# Patient Record
Sex: FEMALE | Race: WHITE | NOT HISPANIC OR LATINO | Employment: FULL TIME | URBAN - METROPOLITAN AREA
[De-identification: names, ages, dates, MRNs, and addresses within clinical notes are randomized per-mention and may not be internally consistent; named-entity substitution may affect disease eponyms.]

---

## 2018-05-09 ENCOUNTER — HOSPITAL ENCOUNTER (OUTPATIENT)
Facility: HOSPITAL | Age: 53
Setting detail: OBSERVATION
Discharge: HOME HEALTH | DRG: 552 | End: 2018-05-15
Attending: EMERGENCY MEDICINE | Admitting: SURGERY
Payer: COMMERCIAL

## 2018-05-09 ENCOUNTER — APPOINTMENT (EMERGENCY)
Dept: RADIOLOGY | Facility: HOSPITAL | Age: 53
DRG: 552 | End: 2018-05-09
Attending: EMERGENCY MEDICINE
Payer: COMMERCIAL

## 2018-05-09 DIAGNOSIS — R55 SYNCOPE, UNSPECIFIED SYNCOPE TYPE: ICD-10-CM

## 2018-05-09 DIAGNOSIS — S32.039A CLOSED FRACTURE OF THIRD LUMBAR VERTEBRA, UNSPECIFIED FRACTURE MORPHOLOGY, INITIAL ENCOUNTER (CMS/HCC): ICD-10-CM

## 2018-05-09 DIAGNOSIS — S32.009A LUMBAR VERTERBRAL FRACTURE, TRAUMATIC: Primary | ICD-10-CM

## 2018-05-09 LAB
ABO + RH BLD: NORMAL
ALBUMIN SERPL-MCNC: 3.9 G/DL (ref 3.4–5)
ALP SERPL-CCNC: 63 IU/L (ref 35–126)
ALT SERPL-CCNC: 11 IU/L (ref 11–54)
ANION GAP SERPL CALC-SCNC: 7 MEQ/L (ref 3–15)
APTT PPP: 25 SEC. (ref 23–35)
AST SERPL-CCNC: 23 IU/L (ref 15–41)
BASOPHILS # BLD: 0.03 K/UL (ref 0.01–0.1)
BASOPHILS NFR BLD: 0.2 %
BILIRUB SERPL-MCNC: 0.8 MG/DL (ref 0.3–1.2)
BLD GP AB SCN SERPL QL: NEGATIVE
BUN SERPL-MCNC: 17 MG/DL (ref 8–20)
CALCIUM SERPL-MCNC: 8.9 MG/DL (ref 8.9–10.3)
CHLORIDE SERPL-SCNC: 108 MMOL/L (ref 98–109)
CO2 SERPL-SCNC: 22 MMOL/L (ref 22–32)
CREAT SERPL-MCNC: 0.8 MG/DL (ref 0.6–1.1)
D AG BLD QL: POSITIVE
DIFFERENTIAL METHOD BLD: ABNORMAL
EOSINOPHIL # BLD: 0.1 K/UL (ref 0.04–0.36)
EOSINOPHIL NFR BLD: 0.8 %
ERYTHROCYTE [DISTWIDTH] IN BLOOD BY AUTOMATED COUNT: 11.9 % (ref 11.7–14.4)
ETHANOL SERPL-MCNC: <5 MG/DL
GFR SERPL CREATININE-BSD FRML MDRD: >60 ML/MIN/1.73M*2
GLUCOSE SERPL-MCNC: 113 MG/DL (ref 70–99)
HCG UR QL: NEGATIVE
HCT VFR BLDCO AUTO: 36.3 % (ref 35–45)
HGB BLD-MCNC: 12.9 G/DL (ref 11.8–15.7)
IMM GRANULOCYTES # BLD AUTO: 0.14 K/UL (ref 0–0.08)
IMM GRANULOCYTES NFR BLD AUTO: 1.1 %
INR PPP: 1.1 INR
LABORATORY COMMENT REPORT: NORMAL
LABORATORY COMMENT REPORT: NORMAL
LYMPHOCYTES # BLD: 2.05 K/UL (ref 1.2–3.5)
LYMPHOCYTES NFR BLD: 16.3 %
MCH RBC QN AUTO: 30.9 PG (ref 28–33.2)
MCHC RBC AUTO-ENTMCNC: 35.5 G/DL (ref 32.2–35.5)
MCV RBC AUTO: 87.1 FL (ref 83–98)
MONOCYTES # BLD: 0.65 K/UL (ref 0.28–0.8)
MONOCYTES NFR BLD: 5.2 %
NEUTROPHILS # BLD: 9.59 K/UL (ref 1.7–7)
NEUTS SEG NFR BLD: 76.4 %
NRBC BLD-RTO: 0 %
PDW BLD AUTO: 8.3 FL (ref 9.4–12.3)
PLATELET # BLD AUTO: 202 K/UL (ref 150–369)
POTASSIUM SERPL-SCNC: 3.2 MMOL/L (ref 3.6–5.1)
PROT SERPL-MCNC: 6.6 G/DL (ref 6–8.2)
PROTHROMBIN TIME: 13.9 SEC. (ref 12.2–14.5)
RBC # BLD AUTO: 4.17 M/UL (ref 3.93–5.22)
SODIUM SERPL-SCNC: 137 MMOL/L (ref 136–144)
TROPONIN I SERPL-MCNC: <0.02 NG/ML
WBC # BLD AUTO: 12.56 K/UL (ref 3.8–10.5)

## 2018-05-09 PROCEDURE — 36415 COLL VENOUS BLD VENIPUNCTURE: CPT | Performed by: EMERGENCY MEDICINE

## 2018-05-09 PROCEDURE — 84484 ASSAY OF TROPONIN QUANT: CPT | Performed by: SURGERY

## 2018-05-09 PROCEDURE — 99285 EMERGENCY DEPT VISIT HI MDM: CPT | Mod: 25

## 2018-05-09 PROCEDURE — G0378 HOSPITAL OBSERVATION PER HR: HCPCS

## 2018-05-09 PROCEDURE — 63700000 HC SELF-ADMINISTRABLE DRUG

## 2018-05-09 PROCEDURE — 72125 CT NECK SPINE W/O DYE: CPT

## 2018-05-09 PROCEDURE — 74177 CT ABD & PELVIS W/CONTRAST: CPT

## 2018-05-09 PROCEDURE — 63600105 HC IODINE BASED CONTRAST: Performed by: EMERGENCY MEDICINE

## 2018-05-09 PROCEDURE — 84703 CHORIONIC GONADOTROPIN ASSAY: CPT | Performed by: EMERGENCY MEDICINE

## 2018-05-09 PROCEDURE — 71260 CT THORAX DX C+: CPT

## 2018-05-09 PROCEDURE — 85610 PROTHROMBIN TIME: CPT | Performed by: EMERGENCY MEDICINE

## 2018-05-09 PROCEDURE — 85025 COMPLETE CBC W/AUTO DIFF WBC: CPT | Performed by: EMERGENCY MEDICINE

## 2018-05-09 PROCEDURE — 80053 COMPREHEN METABOLIC PANEL: CPT | Performed by: EMERGENCY MEDICINE

## 2018-05-09 PROCEDURE — 63700000 HC SELF-ADMINISTRABLE DRUG: Performed by: PHYSICIAN ASSISTANT

## 2018-05-09 PROCEDURE — 85730 THROMBOPLASTIN TIME PARTIAL: CPT | Performed by: EMERGENCY MEDICINE

## 2018-05-09 PROCEDURE — 20000000 HC ROOM AND CARE ICU

## 2018-05-09 PROCEDURE — 73610 X-RAY EXAM OF ANKLE: CPT | Mod: LT

## 2018-05-09 PROCEDURE — 70450 CT HEAD/BRAIN W/O DYE: CPT

## 2018-05-09 PROCEDURE — 25800000 HC PHARMACY IV SOLUTIONS: Performed by: PHYSICIAN ASSISTANT

## 2018-05-09 PROCEDURE — 93005 ELECTROCARDIOGRAM TRACING: CPT | Performed by: EMERGENCY MEDICINE

## 2018-05-09 PROCEDURE — 86900 BLOOD TYPING SEROLOGIC ABO: CPT

## 2018-05-09 PROCEDURE — G0480 DRUG TEST DEF 1-7 CLASSES: HCPCS | Performed by: EMERGENCY MEDICINE

## 2018-05-09 RX ORDER — SODIUM CHLORIDE 9 MG/ML
1000 INJECTION, SOLUTION INTRAVENOUS CONTINUOUS
Status: ACTIVE | OUTPATIENT
Start: 2018-05-09 | End: 2018-05-10

## 2018-05-09 RX ORDER — POTASSIUM CHLORIDE 14.9 MG/ML
20 INJECTION INTRAVENOUS AS NEEDED
Status: DISCONTINUED | OUTPATIENT
Start: 2018-05-09 | End: 2018-05-15 | Stop reason: HOSPADM

## 2018-05-09 RX ORDER — OXYCODONE HYDROCHLORIDE 5 MG/1
5-10 TABLET ORAL EVERY 4 HOURS PRN
Status: DISCONTINUED | OUTPATIENT
Start: 2018-05-09 | End: 2018-05-13

## 2018-05-09 RX ORDER — ONDANSETRON 4 MG/1
4 TABLET, ORALLY DISINTEGRATING ORAL EVERY 8 HOURS PRN
Status: DISCONTINUED | OUTPATIENT
Start: 2018-05-09 | End: 2018-05-15 | Stop reason: HOSPADM

## 2018-05-09 RX ORDER — OXYCODONE HYDROCHLORIDE 5 MG/1
TABLET ORAL
Status: COMPLETED
Start: 2018-05-09 | End: 2018-05-09

## 2018-05-09 RX ORDER — IBUPROFEN 200 MG
16-32 TABLET ORAL AS NEEDED
Status: DISCONTINUED | OUTPATIENT
Start: 2018-05-09 | End: 2018-05-15 | Stop reason: HOSPADM

## 2018-05-09 RX ORDER — POTASSIUM CHLORIDE 750 MG/1
20 TABLET, FILM COATED, EXTENDED RELEASE ORAL AS NEEDED
Status: DISCONTINUED | OUTPATIENT
Start: 2018-05-09 | End: 2018-05-15 | Stop reason: HOSPADM

## 2018-05-09 RX ORDER — POTASSIUM CHLORIDE 750 MG/1
40 TABLET, FILM COATED, EXTENDED RELEASE ORAL AS NEEDED
Status: DISCONTINUED | OUTPATIENT
Start: 2018-05-09 | End: 2018-05-15 | Stop reason: HOSPADM

## 2018-05-09 RX ORDER — DEXTROSE 50 % IN WATER (D50W) INTRAVENOUS SYRINGE
25 AS NEEDED
Status: DISCONTINUED | OUTPATIENT
Start: 2018-05-09 | End: 2018-05-15 | Stop reason: HOSPADM

## 2018-05-09 RX ORDER — MAGNESIUM SULFATE HEPTAHYDRATE 40 MG/ML
2 INJECTION, SOLUTION INTRAVENOUS AS NEEDED
Status: ACTIVE | OUTPATIENT
Start: 2018-05-09 | End: 2018-05-13

## 2018-05-09 RX ORDER — ONDANSETRON HYDROCHLORIDE 2 MG/ML
4 INJECTION, SOLUTION INTRAVENOUS EVERY 8 HOURS PRN
Status: DISCONTINUED | OUTPATIENT
Start: 2018-05-09 | End: 2018-05-15 | Stop reason: HOSPADM

## 2018-05-09 RX ORDER — ACETAMINOPHEN 325 MG/1
650 TABLET ORAL EVERY 4 HOURS PRN
Status: DISCONTINUED | OUTPATIENT
Start: 2018-05-09 | End: 2018-05-15 | Stop reason: HOSPADM

## 2018-05-09 RX ORDER — DEXTROSE 40 %
15-30 GEL (GRAM) ORAL AS NEEDED
Status: DISCONTINUED | OUTPATIENT
Start: 2018-05-09 | End: 2018-05-15 | Stop reason: HOSPADM

## 2018-05-09 RX ADMIN — OXYCODONE HYDROCHLORIDE 5 MG: 5 TABLET ORAL at 19:57

## 2018-05-09 RX ADMIN — SODIUM CHLORIDE 1000 ML: 9 INJECTION, SOLUTION INTRAVENOUS at 21:54

## 2018-05-09 RX ADMIN — IOHEXOL 115 ML: 300 INJECTION, SOLUTION INTRAVENOUS at 18:22

## 2018-05-09 RX ADMIN — ACETAMINOPHEN 650 MG: 325 TABLET, FILM COATED ORAL at 23:15

## 2018-05-09 ASSESSMENT — ENCOUNTER SYMPTOMS
SEIZURES: 0
BACK PAIN: 1
SORE THROAT: 0
EYE PAIN: 0
ARTHRALGIAS: 0
ABDOMINAL PAIN: 0
COUGH: 0
HEMATURIA: 0
FEVER: 0
SHORTNESS OF BREATH: 0
PALPITATIONS: 0
DYSURIA: 0
CHILLS: 0
COLOR CHANGE: 0

## 2018-05-09 ASSESSMENT — COGNITIVE AND FUNCTIONAL STATUS - GENERAL
EATING MEALS: 4 - NONE
WALKING IN HOSPITAL ROOM: 4 - NONE
HELP NEEDED FOR BATHING: 4 - NONE
CLIMB 3 TO 5 STEPS WITH RAILING: 4 - NONE
DRESSING REGULAR UPPER BODY CLOTHING: 4 - NONE
HELP NEEDED FOR PERSONAL GROOMING: 4 - NONE
MOVING TO AND FROM BED TO CHAIR: 4 - NONE
TOILETING: 4 - NONE
DRESSING REGULAR LOWER BODY CLOTHING: 4 - NONE
STANDING UP FROM CHAIR USING ARMS: 4 - NONE

## 2018-05-09 NOTE — ED PROVIDER NOTES
"HPI     No chief complaint on file.      53 yo F with no PMH presents after found down on deck after falling from 8ft ladder.  Does not take any meds  Was down x 1 hour  No LOC  C/o low back pain.   When helped up by medics, pt syncopized                 Patient History     No past medical history on file.    No past surgical history on file.    No family history on file.    Social History   Substance Use Topics   • Smoking status: Not on file   • Smokeless tobacco: Not on file   • Alcohol use Not on file       Systems Reviewed from Nursing Triage:          Review of Systems     Review of Systems   Constitutional: Negative for chills and fever.   HENT: Negative for ear pain and sore throat.    Eyes: Negative for pain and visual disturbance.   Respiratory: Negative for cough and shortness of breath.    Cardiovascular: Negative for chest pain and palpitations.   Gastrointestinal: Negative for abdominal pain.   Genitourinary: Negative for dysuria and hematuria.   Musculoskeletal: Positive for back pain. Negative for arthralgias.   Skin: Negative for color change and rash.   Neurological: Negative for seizures and syncope.   All other systems reviewed and are negative.       Physical Exam     ED Triage Vitals [05/09/18 1756]   Temp Pulse Resp BP SpO2   36.5 °C (97.7 °F) -- 20 122/73 100 %      Temp Source Heart Rate Source Patient Position BP Location FiO2 (%) (Set)   Temporal -- -- -- --                     Patient Vitals for the past 24 hrs:   BP Temp Temp src Resp SpO2 Height Weight   05/09/18 1813 118/71 - - 20 100 % 1.753 m (5' 9\") 70.3 kg (155 lb)   05/09/18 1756 122/73 36.5 °C (97.7 °F) Temporal 20 100 % - -           Physical Exam   Constitutional: She appears well-developed and well-nourished. No distress.   HENT:   Head: Normocephalic.   Left occipital contusion   Eyes: Conjunctivae and EOM are normal. Pupils are equal, round, and reactive to light.   Neck:   c-collar in place   Cardiovascular: Normal rate and " regular rhythm.    No murmur heard.  Pulmonary/Chest: Effort normal and breath sounds normal. No respiratory distress.   Abdominal: Soft. There is no tenderness.   Musculoskeletal: Normal range of motion. She exhibits no edema.   Low L-spine tenderness. No chest wall tenderness, pelvis stable   Neurological: She is alert.   Skin: Skin is warm and dry.   Abrasion to right lateral thigh and anterior lower leg   Psychiatric: She has a normal mood and affect.   Nursing note and vitals reviewed.           Procedures    ED Course & MDM     Labs Reviewed   CBC - Abnormal        Result Value    WBC 12.56 (*)     MPV 8.3 (*)     RBC 4.17      Hemoglobin 12.9      Hematocrit 36.3      MCV 87.1      MCH 30.9      MCHC 35.5      RDW 11.9      Platelets 202     DIFF COUNT - Abnormal     Immature Granulocytes, Absolute 0.14 (*)     Neutrophils, Absolute 9.59 (*)     Differential Type Auto      nRBC 0.0      Immature Granulocytes 1.1      Neutrophils 76.4      Lymphocytes 16.3      Monocytes 5.2      Eosinophils 0.8      Basophils 0.2      Lymphocytes, Absolute 2.05      Monocytes, Absolute 0.65      Eosinophils, Absolute 0.10      Basophils, Absolute 0.03     CBC AND DIFFERENTIAL    Narrative:     The following orders were created for panel order CBC and differential.  Procedure                               Abnormality         Status                     ---------                               -----------         ------                     CBC[[id number]]                           Abnormal            Final result               Diff Count[[id number]]                    Abnormal            Final result                 Please view results for these tests on the individual orders.   COMPREHENSIVE METABOLIC PANEL   BHCG, SERUM, QUAL   DRUG SCREEN PANEL, URINE   ETHANOL   PROTIME-INR   PTT   TYPE AND SCREEN   URINALYSIS W REFLEX TO MICROSCOPIC    Narrative:     The following orders were created for panel order Urinalysis.  Procedure                                Abnormality         Status                     ---------                               -----------         ------                     UA Macroscopic[[id number]]                                                                 Please view results for these tests on the individual orders.   RAINBOW DRAW PANEL    Narrative:     The following orders were created for panel order [facility] Draw Panel.  Procedure                               Abnormality         Status                     ---------                               -----------         ------                     RAINBOW RED[[id number]]                                       In process                 RAINBOW LT GREEN[[id number]]                                  In process                   Please view results for these tests on the individual orders.   UA MACROSCOPIC   RAINBOW RED   RAINBOW LT GREEN       CT HEAD WITHOUT IV CONTRAST    (Results Pending)   CT CERVICAL SPINE WITHOUT IV CONTRAST    (Results Pending)   CT CHEST WITH IV CONTRAST    (Results Pending)   CT ABDOMEN PELVIS WITH IV CONTRAST    (Results Pending)   X-RAY ANKLE LEFT 3+ VIEWS    (Results Pending)           MDM  Number of Diagnoses or Management Options  Diagnosis management comments: Found down/syncope likely vasovagal 2/2 pain or prolonged supine position but can represent cardiac etiology or other injury  -labs  -ekg  -panscan  -eval/care per trauma           ED Course as of May 09 2007   Wed May 09, 2018   1840 IMPRESSION:  Mild to moderate compression deformity involving superior endplate of L3  vertebral body of unknown chronicity without other post traumatic abnormality in  the chest, abdomen and pelvis.  []   1841   IMPRESSION:  No posttraumatic intracranial abnormality.  []   2004 IMPRESSION:    No acute bony abnormality.  []   2006 Admitted to trauma.  []      ED Course User Index  [] Izabela Cm MD         Clinical Impressions as of May 09 2007    Closed fracture of third lumbar vertebra, unspecified fracture morphology, initial encounter (CMS/HCC) ([facility])     Disposition:       Izabela Cm MD  Resident  05/09/18 2007

## 2018-05-09 NOTE — ED NOTES
EMS reports that she fell from an 8 foot ladder.  Was assisted to feet and then passed out.     Paula Dixon RN  05/09/18 1870

## 2018-05-09 NOTE — ED ATTESTATION NOTE
Procedures  Physical Exam  Review of Systems    5/9/20186:07 PM  I have personally seen and examined the patient.  I reviewed and agree with the PA/NP/Resident's assessment and plan of care.    My examination, assessment, and plan of care [facility] is as follows:  The patient presents with lower back pain status post a fall.  The patient was on a ladder when the ladder moved and she fell striking her head.  We do not believe there was a loss of consciousness.  The patient was lying on her back around an hour and then attempted to get up when she then syncopized.  There was a loss of consciousness involved with that episode.  Patient was then flown to [place]  Please see nursing note and EPIC trauma narrator (flow sheet) for accurate time line.   Please see trauma narrator for the physical exam that was completed by me and scribed by the nurse.    ATLS evaluation done by ED MD and Trauma Team  Imaging, observe          I was physically present for the key/critical portions of the following procedures: None     Marco Hansen MD  05/09/18 7825

## 2018-05-09 NOTE — H&P
TRAUMA SURGERY HISTORY & PHYSICAL     [facility]:  [facility]      YOB: 1902   AGE:  89 y.o.      GENDER: female  MRN:  [id number]          PATIENT #: [id number]    CHIEF COMPLAINT:  Trauma    Activation Time: 1756 Level of Trauma: Code 9   Time Patient Seen: Code 9      HISTORY OF INJURY   The patient is a 52 year old female who denies any significant past medical history and with a past surgical history significant for cholecystectomy who presents after a fall from ladder.  She apparently struck her head but had no immediate loss of consciousness. She was unable to get up. EMS was eventually called.  When they stood her up, she had a syncopal event.  She came to. She was hemodynamically stable.  She was flown here as Code 9 trauma activation.  She was hemodynamically stable on arrival.  She has bilateral hip pain and lower back pain.     Patient has vital signs that may not be electronically linked with the chart.   Initial vital signs upon arrival in the [facility]:  BP  122/73      P  61      RR  20      SpO2   100% RA       T  97.7    RESUSCITATION:   O2: RA Lines/Location: PIV   Intubation: No IVF/PRBCs/Amount: No product resuscitation required at this time.      REVIEW OF SYSTEMS   13 point review of systems completed. Negative except for above mentioned history.    PAST MEDICAL HISTORY   Denies any significant PMH    PAST SURGICAL HISTORY   Cholecystectomy     FAMILY HISTORY   Non-contributory    SOCIAL HISTORY     Denies current tobacco use of abuse  Occasional EtOH use  Denies past or current IV or illicit drug use or abuse.     MEDICATIONS   Denies any current medications.     ALLERGIES   NKDA    PRIMARY CARE PHYSICIAN   To Obtain Pcp Unable    PRIMARY SURVEY   Airway: Patent:  Intact - speaking/phonating easily.   Breathing: Spontaneous, unlabored:  Symmetric.   Circulation:  Skin is pink/warm/dry, central and peripheral pulses present.   Disability: Alert and moving all  extremities. GCS 15       SECONDARY SURVEY   Physical Exam  Head:  Hematoma to right of occiput.  Small scalp abrasion.  No apparent laceration.   Face:  Nontender on palpation of facial bones.   Ears:  No external trauma.  No right or left hemotympanum.   Nose:  No nasal deformity.  No nasal discharge.   Eyes:  Pupils 7 mm and reactive bilaterally.  EOMI. No hyphema.  No evidence of globe trauma.   Mouth:  No oropharyngeal lesion.   Neck:  Nontender on palpation of posterior midline.  Trachea is midline anteriorly.   Chest wall:  Mild tenderness on palpation over sternum.   Lungs: CTAB/L - no diminished breath sounds.   Heart:  RRR - no muffled heart tones.   Abdomen:  Soft, NT/ND - no R/G.   Pelvis:  No tederness on pelvic rock (depite c/o hip pain) - patient does have low back pain on pelvic rock.  Back:  Tenderness over lumbar spine - no obvious fracture or stepoff.    Upper extremities: Atraumatic bilaterally. All compartments soft.  Full sensation, pulses, motor.   Abrasion over right thigh and shin.  All compartments soft. Full pulses.   Left thigh and calf compartments soft.  Full pulses and sensation.  Tenderness on palpation of left ankle.   Neurologic:  GCS 15        IMPRESSION/PLAN   53 y/o female s/p fall from ladder.    1. Fall from ladder.   2. Cephalohematoma on examination - check CT brain.   3. Cervical collar in place - check CT cervical spine.   4. Check CT C/A/P  5. Lower back tenderness - will evaluate lumbar spine on CT torso.   6. Left ankle tenderness - check plain film.   7. Syncope:  Check EKG, TPN I.    8. Check full laboratory evaluation.             AUTHOR:  Dmitriy Tapia MD  Trauma Service pager #[phone], [phone]  5/9/2018  6:05 PM

## 2018-05-10 ENCOUNTER — APPOINTMENT (INPATIENT)
Dept: OCCUPATIONAL THERAPY | Facility: HOSPITAL | Age: 53
DRG: 552 | End: 2018-05-10
Attending: PHYSICIAN ASSISTANT
Payer: COMMERCIAL

## 2018-05-10 PROBLEM — R55 SYNCOPE: Status: ACTIVE | Noted: 2018-05-10

## 2018-05-10 LAB
RAINBOW HOLD SPECIMEN: NORMAL
RAINBOW HOLD SPECIMEN: NORMAL

## 2018-05-10 PROCEDURE — G0378 HOSPITAL OBSERVATION PER HR: HCPCS

## 2018-05-10 PROCEDURE — 97165 OT EVAL LOW COMPLEX 30 MIN: CPT | Mod: GO

## 2018-05-10 PROCEDURE — 63700000 HC SELF-ADMINISTRABLE DRUG: Performed by: PHYSICIAN ASSISTANT

## 2018-05-10 PROCEDURE — 99253 IP/OBS CNSLTJ NEW/EST LOW 45: CPT | Performed by: NEUROLOGICAL SURGERY

## 2018-05-10 PROCEDURE — 63600000 HC DRUGS/DETAIL CODE: Performed by: PHYSICIAN ASSISTANT

## 2018-05-10 PROCEDURE — 20000000 HC ROOM AND CARE ICU

## 2018-05-10 RX ADMIN — OXYCODONE HYDROCHLORIDE 10 MG: 5 TABLET ORAL at 06:24

## 2018-05-10 RX ADMIN — OXYCODONE HYDROCHLORIDE 10 MG: 5 TABLET ORAL at 16:43

## 2018-05-10 RX ADMIN — OXYCODONE HYDROCHLORIDE 10 MG: 5 TABLET ORAL at 00:07

## 2018-05-10 RX ADMIN — OXYCODONE HYDROCHLORIDE 10 MG: 5 TABLET ORAL at 10:43

## 2018-05-10 RX ADMIN — ONDANSETRON 4 MG: 2 INJECTION INTRAMUSCULAR; INTRAVENOUS at 12:14

## 2018-05-10 ASSESSMENT — COGNITIVE AND FUNCTIONAL STATUS - GENERAL
EATING MEALS: 4 - NONE
TOILETING: 3 - A LITTLE
HELP NEEDED FOR BATHING: 3 - A LITTLE
DRESSING REGULAR LOWER BODY CLOTHING: 3 - A LITTLE
HELP NEEDED FOR PERSONAL GROOMING: 3 - A LITTLE
DRESSING REGULAR UPPER BODY CLOTHING: 3 - A LITTLE

## 2018-05-10 NOTE — PROGRESS NOTES
Patient: Ofe Arzola  Location: [facility]  MRN: [id number]  Today's date: 5/10/2018    Attempted to see patient for therapy. Unable due to patient medically unstable.  awaiting LSO. Will follow.    2nd attempt at 15:15, cont to await LSO.

## 2018-05-10 NOTE — PROGRESS NOTES
CT Cspine reviewed- no acute findings; Cervical spine completely nontender.   Full ROM without any midline tenderness. + minimal left trapezius tenderness. Aspen d/c'd.   Reviewed CT findings of L3 end plate fx. Remains neuro intact. Nsx consult pending in AM

## 2018-05-10 NOTE — ASSESSMENT & PLAN NOTE
This was a vagal induced syncope from extreme pain when ems lifted patient.  Cardiac monitor stable over night

## 2018-05-10 NOTE — PROGRESS NOTES
"Daily Progress Note    Daily Progress Note    Subjective     Interval History: none.       Objective     Vital signs in last 24 hours:  Temp:  [36.5 °C (97.7 °F)-36.8 °C (98.3 °F)] 36.5 °C (97.7 °F)  Heart Rate:  [49-73] 51  Resp:  [13-20] 16  BP: ()/(53-75) 91/53      Intake/Output Summary (Last 24 hours) at 05/10/18 0557  Last data filed at 05/10/18 0400   Gross per 24 hour   Intake              968 ml   Output              600 ml   Net              368 ml     Intake/Output this shift:  I/O this shift:  In: 968 [P.O.:480; I.V.:488]  Out: 600 [Urine:600]    Labs    I have reviewed the patients labs until the time of note; no clinical concerns    Imaging  I have independently reviewed patient's imaging; no concerning findings.    VTE Assessment  Patient's VTE risk appears at baseline; no specific VTE Chemoprophylaxis indicated       Physical Exam:  BP (!) 91/53   Pulse (!) 51   Temp 36.5 °C (97.7 °F) (Oral)   Resp 16   Ht 1.753 m (5' 9\")   Wt 70.3 kg (155 lb)   SpO2 99%   BMI 22.89 kg/m²      General Appearance:    Alert, cooperative, no distress, appears stated age   Head:    Normocephalic, without obvious abnormality, atraumatic   Eyes:    PERRL, conjunctiva/corneas clear, EOM's intact, fundi     benign, both eyes   Ears:    Normal TM's and external ear canals, both ears   Nose:   Nares normal, septum midline, mucosa normal, no drainage     or     sinus tenderness   Throat:   Lips, mucosa, and tongue normal; teeth and gums normal   Neck:   Supple, symmetrical, trachea midline, no adenopathy;     thyroid:  no enlargement/tenderness/nodules; no carotid    bruit or JVD   Back:     Symmetric, no curvature, ROM normal, no CVA tenderness   Lungs:     Clear to auscultation bilaterally, respirations unlabored   Chest Wall:    No tenderness or deformity   Heart:    Regular rate and rhythm, S1 and S2 normal, no murmur, rub or          gallop   Breast Exam:    No tenderness, masses, or nipple abnormality "   Abdomen:     Soft, non-tender, bowel sounds active all four quadrants,     no masses, no organomegaly   Genitalia:    Normal female without lesion, discharge or tenderness   Rectal:    Normal tone, no masses or tenderness; guaiac negative stool   Extremities:   Extremities normal, atraumatic, no cyanosis or edema   Musculoskeletal:  Pulses:   No injury or deformity    2+ and symmetric all extremities   Skin:   Skin color, texture, turgor normal, no rashes or lesions   Lymph nodes:   Cervical, supraclavicular, and axillary nodes normal   Neurologic:    Behavior/  Emotional:   CNII-XII intact, normal strength, sensation and reflexes     throughout    Appropriate, cooperative         Assessment & Plan  Syncope   Assessment & Plan    This was a vagal induced syncope from extreme pain when ems lifted patient.  Cardiac monitor stable over night        Lumbar verterbral fracture, traumatic (CMS/[facility]) ([facility])   Assessment & Plan    Lumbar compression fx, NS to see, neuro intact, brace vs surgery, will have plan today            Expected Discharge Date:  5/12/2018

## 2018-05-10 NOTE — SUBJECTIVE & OBJECTIVE
"Daily Progress Note    Subjective     Interval History: none.       Objective     Vital signs in last 24 hours:  Temp:  [36.5 °C (97.7 °F)-36.8 °C (98.3 °F)] 36.5 °C (97.7 °F)  Heart Rate:  [49-73] 51  Resp:  [13-20] 16  BP: ()/(53-75) 91/53      Intake/Output Summary (Last 24 hours) at 05/10/18 0557  Last data filed at 05/10/18 0400   Gross per 24 hour   Intake              968 ml   Output              600 ml   Net              368 ml     Intake/Output this shift:  I/O this shift:  In: 968 [P.O.:480; I.V.:488]  Out: 600 [Urine:600]    Labs    I have reviewed the patients labs until the time of note; no clinical concerns    Imaging  I have independently reviewed patient's imaging; no concerning findings.    VTE Assessment  Patient's VTE risk appears at baseline; no specific VTE Chemoprophylaxis indicated       Physical Exam:  BP (!) 91/53   Pulse (!) 51   Temp 36.5 °C (97.7 °F) (Oral)   Resp 16   Ht 1.753 m (5' 9\")   Wt 70.3 kg (155 lb)   SpO2 99%   BMI 22.89 kg/m²     General Appearance:    Alert, cooperative, no distress, appears stated age   Head:    Normocephalic, without obvious abnormality, atraumatic   Eyes:    PERRL, conjunctiva/corneas clear, EOM's intact, fundi     benign, both eyes   Ears:    Normal TM's and external ear canals, both ears   Nose:   Nares normal, septum midline, mucosa normal, no drainage     or     sinus tenderness   Throat:   Lips, mucosa, and tongue normal; teeth and gums normal   Neck:   Supple, symmetrical, trachea midline, no adenopathy;     thyroid:  no enlargement/tenderness/nodules; no carotid    bruit or JVD   Back:     Symmetric, no curvature, ROM normal, no CVA tenderness   Lungs:     Clear to auscultation bilaterally, respirations unlabored   Chest Wall:    No tenderness or deformity   Heart:    Regular rate and rhythm, S1 and S2 normal, no murmur, rub or          gallop   Breast Exam:    No tenderness, masses, or nipple abnormality   Abdomen:     Soft, " non-tender, bowel sounds active all four quadrants,     no masses, no organomegaly   Genitalia:    Normal female without lesion, discharge or tenderness   Rectal:    Normal tone, no masses or tenderness; guaiac negative stool   Extremities:   Extremities normal, atraumatic, no cyanosis or edema   Musculoskeletal:  Pulses:   No injury or deformity    2+ and symmetric all extremities   Skin:   Skin color, texture, turgor normal, no rashes or lesions   Lymph nodes:   Cervical, supraclavicular, and axillary nodes normal   Neurologic:    Behavior/  Emotional:   CNII-XII intact, normal strength, sensation and reflexes     throughout    Appropriate, cooperative

## 2018-05-10 NOTE — CONSULTS
Neurosurgery Consultation     Chief Complaint:  Back pain.    HPI      Patient is a 89 y.o. female (actually 49+yo) who presents with L3 compression fracture. She states that she works as a  and yesterday was on a ladder while working when the ladder gave out, causing her to fall approximately 8 feet.  She says she fell on her back and hit the back of her head.  Denies LOC but does say that when EMS came and tried to stand her up, she had a syncopal event.  She was hemodynamically stable but CT scan demonstrated lumbar fracture.  Currently she denies headache, denies neck pain or radiating pain into the arms or legs.  She denies numbness or tingling in her extremities.  No bowel or bladder incontinence.  She does have low back pain associated with movement.  No chest pain, no SOB or difficulty breathing.    Medical History:   Past Medical History:   Diagnosis Date   • Hives    • Syncope        Surgical History:   Past Surgical History:   Procedure Laterality Date   • CHOLECYSTECTOMY         Social History:   Social History     Social History Narrative   • No narrative on file       Family History: History reviewed. No pertinent family history.    Allergies: Patient has no known allergies.    Home Medications:      Current Medications:  •  acetaminophen, 650 mg, oral, q4h PRN  •  calcium gluconate, 1 g, intravenous, PRN  •  glucose, 16-32 g of dextrose, oral, PRN **OR** dextrose, 15-30 g of dextrose, oral, PRN **OR** glucagon, 1 mg, intramuscular, PRN **OR** dextrose in water, 25 mL, intravenous, PRN  •  magnesium sulfate, 1 g, intravenous, PRN **OR** magnesium sulfate, 2 g, intravenous, PRN  •  ondansetron ODT, 4 mg, oral, q8h PRN **OR** ondansetron, 4 mg, intravenous, q8h PRN  •  oxyCODONE, 5-10 mg, oral, q4h PRN  •  potassium chloride, 20 mEq, oral, PRN **OR** potassium chloride, 40 mEq, oral, PRN **OR** potassium chloride, 20 mEq, intravenous, PRN **OR** potassium chloride, 40 mEq, intravenous, PRN  •   sodium chloride 0.9 %, 1,000 mL, intravenous, Continuous    Review of Systems: 14 point review of systems are negative except for the pertinent positives as seen in the HPI portion of this note.    Objective     Vital Signs for the last 24 hours:  Temp:  [36.5 °C (97.7 °F)-36.8 °C (98.3 °F)] 36.5 °C (97.7 °F)  Heart Rate:  [47-73] 47  Resp:  [13-20] 14  BP: ()/(52-75) 95/52       Physicial Exam    Awake, alert, oriented to person, place, time and situation; speech and fund of knowledge normal. Neck is supple w/ FROM, is nontender. No evidence of labored breathing and chest CTA. Heart has a regular rate and rhythm. Abdomen soft, NT, ND.    Neurologic Examination:     Mental status: awake, alert, and oriented to person, place, and time. Speech and fund of knowledge are normal. GCS 15    PERRL, EOMI, face symmetric, tongue protrudes to the midline, no nystagmus or diplopia.     Motor:   RUE: D: 5/5, T: 5/5, B: 5/5, WE: 5/5, H/5, IH: 5/5  LUE: D: 5/5, T: 5/5, B: 5/5, WE: 5/5, H/5, IH: 5/5  RLE: IP  5/5, Q  5/5, DF 5/5, EHL 5/5, PF 5/5  LLE: IP  5/5, Q  5/5, DF 5/5, EHL 5/5, PF 5/5    Reflexes: Normoreflexive throughout.   Negative dobbins and clonus bilaterally.     Sensation: intact to light touch, pain, temperature, and joint position sense testing.      No drift or dymetria.     Labs  I have reviewed the patient's labs.  Current labs are within normal limits.  Lab Results   Component Value Date    GLUCOSE 113 (H) 2018    CALCIUM 8.9 2018     2018    K 3.2 (L) 2018    CO2 22 2018     2018    BUN 17 2018    CREATININE 0.8 2018     Lab Results   Component Value Date    WBC 12.56 (H) 2018    HGB 12.9 2018    HCT 36.3 2018    MCV 87.1 2018     2018     Lab Results   Component Value Date    ALBUMIN 3.9 2018    BILITOT 0.8 2018    ALKPHOS 63 2018    AST 23 2018    ALT 11 2018     PROTEIN 6.6 05/09/2018       Imaging  Independent review of most recent imaging and all relevant previous imaging was compared with the reading of the attending radiologist. Significant findings include:     CT abdomen/pelvis shows a compression fracture of the L3 vertebral body.  Fracture involves vertebral body only with no extension into pedicles or posterior elements.  No malalignment      X-ray Ankle Left 3+ Views    Result Date: 5/9/2018  CLINICAL HISTORY: Fall from a lateral. COMMENT: Four views of the left ankle are performed.  No acute fractures or dislocation are seen.  Ankle mortise is symmetrical.  No soft tissue edema is visualized.     IMPRESSION: No acute bony abnormality.    Ct Head Without Iv Contrast    Result Date: 5/9/2018  CLINICAL HISTORY: Status post fall. COMMENT: Contiguous axial CT images of the brain is performed without intravenous contrast with sagittal and coronal reformatted images. COMPARISON:  None CT DOSE: One or more dose reduction technique (e.g.automated exposure control, adjustment of the kV and/or mA according to the patient's size, use of iterative reconstruction technique) utilized for this examination. No intraparenchymal mass, acute infarcts or hemorrhages are seen. The ventricles and the cisterns are normal. No extra-axial masses or focal fluid collections are seen. There is no midline shift. Visualized portions of the paranasal sinuses and the mastoid air cells are clear. No acute fractures are seen.  Left parietal soft tissue edema/hematoma is seen.     IMPRESSION: No posttraumatic intracranial abnormality.    Ct Chest With Iv Contrast    Result Date: 5/9/2018  CLINICAL HISTORY: Status post fall from a ladder. COMMENT: Contiguous axial CT images of the chest, abdomen and pelvis are performed with 115 cc of intravenous Omnipaque 350 without oral contrast. Sagittal and coronal reformatted images are also created. COMPARISON:  None CT DOSE: One or more dose reduction  technique (e.g.automated exposure control, adjustment of the kV and/or mA according to the patient's size, use of iterative reconstruction technique) utilized for this examination. No mediastinal hematoma is seen. The aorta is normal in appearance.  The lungs are clear.  There is no pneumothorax or pleural effusions.  Small hiatal hernia is seen. No post traumatic abnormalities are seen in the liver, spleen, pancreas, bilateral adrenal glands and the kidneys.  Patient is status post cholecystectomy. Urinary bladder is normal in appearance. The other pelvic structures are normal. There is no bowel wall thickening or distended loops of bowel. No mesenteric stranding is visualized. No free fluid is seen.  No adenopathy is visualized. Mild to moderate superior endplate fracture is seen involving L3 vertebral body of unknown chronicity.  No other fractures are seen involving the spine and pelvis.     IMPRESSION: Mild to moderate compression deformity involving superior endplate of L3 vertebral body of unknown chronicity without other post traumatic abnormality in the chest, abdomen and pelvis.    Ct Cervical Spine Without Iv Contrast    Result Date: 5/9/2018  CLINICAL HISTORY: Fall from a ladder COMMENT: Contiguous axial CT images of the cervical spine are performed with sagittal and coronal reformatted images. COMPARISON:  None CT DOSE: One or more dose reduction technique (e.g.automated exposure control, adjustment of the kV and/or mA according to the patient's size, use of iterative reconstruction technique) utilized for this examination. No acute fractures or spondylolisthesis are identified.  Minimal spondylolisthesis is seen at C5-C6 level where C6 vertebral body is anterior to C5 vertebral body.  Mild degenerative changes are seen in the cervical spine most prominent at C5-C6 level where there is mild disc osteophyte bulge with minimal bilateral uncovertebral hypertrophy causing minimal central canal stenosis  without neural foraminal stenosis.  No soft tissue edema is seen. The sagittal and coronal reformatted images demonstrate normal facet joint alignment. Evaluation of the central canal is limited in the lower cervical spine. Visualized lung apices are clear.  Mild opacification of left mastoid air cell is seen.     IMPRESSION:  No acute fractures.  As clinically indicated, MRI is recommended for further evaluation.     Ct Abdomen Pelvis With Iv Contrast    Result Date: 5/9/2018  CLINICAL HISTORY: Status post fall from a ladder. COMMENT: Contiguous axial CT images of the chest, abdomen and pelvis are performed with 115 cc of intravenous Omnipaque 350 without oral contrast. Sagittal and coronal reformatted images are also created. COMPARISON:  None CT DOSE: One or more dose reduction technique (e.g.automated exposure control, adjustment of the kV and/or mA according to the patient's size, use of iterative reconstruction technique) utilized for this examination. No mediastinal hematoma is seen. The aorta is normal in appearance.  The lungs are clear.  There is no pneumothorax or pleural effusions.  Small hiatal hernia is seen. No post traumatic abnormalities are seen in the liver, spleen, pancreas, bilateral adrenal glands and the kidneys.  Patient is status post cholecystectomy. Urinary bladder is normal in appearance. The other pelvic structures are normal. There is no bowel wall thickening or distended loops of bowel. No mesenteric stranding is visualized. No free fluid is seen.  No adenopathy is visualized. Mild to moderate superior endplate fracture is seen involving L3 vertebral body of unknown chronicity.  No other fractures are seen involving the spine and pelvis.     IMPRESSION: Mild to moderate compression deformity involving superior endplate of L3 vertebral body of unknown chronicity without other post traumatic abnormality in the chest, abdomen and pelvis.          Assessment/Plan   49+ yo F s/p fall to  ground from about 8 feet is found to have L3 vertebral body compression fracture.  - No indication requiring surgical intervention at this time.  Fracture according to TLICS criteria shows findings most consistent with stable fracture  - Therefore we recommend LSO brace for comfort.  She can have activity as tolerated but I believe the brace will provide her with pain control.    - Please obtain standing xrays prior to discharge.  Will need follow up xrays at 1 month and 3 months.    Thank you for the consultation.  Please call with any questions/clarifications/concerns    Code Status: Full Code    Camden Ruiz MD  5/10/2018 7:44 AM

## 2018-05-10 NOTE — PROGRESS NOTES
Patient: Ofe Arzola  Location: [facility]  MRN: [id number]  Today's date: 5/10/2018    Attempted to see patient for therapy. Unable due to patient medically unstable.     Waiting for TLSO to mobilize patient  With L 3 endplate fx s/p fall

## 2018-05-10 NOTE — PROGRESS NOTES
Called to room by nursing regarding questions about disability.  Met with pt, sister and brother in law in room.  Sister asked if care coordination helps with applying for disability.  Explained to pt and family that the disability claim is filed through a pt's employer.  Pt and sister states that this is not through an employer and not a workman's comp case.  Asked pt is she as a personal disability policy and pt stated she thinks she does and will check when she gets home.  Explained to pt that if there is paperwork to be completed then the trauma coordinator typically completes it.  Sister asking about a letter being drawn up.  Asked pt what the letter would be needed and who the letter would need to be addressed to and pt stated she wasn't sure.  Explained to pt and family that a letter could be drawn up if needed and we would need who the letter would need to be addressed to.  Sister asking about homecare vs outpt PT  Explained to sister that pt could have homecare set up and VN and PT could come to the home.  Sister asking if pt could have outpt Pt.  Explained to sister that pt would need to be able to get to the outpt center and be able to get in and out of the car.  And perhaps homecare would be better to start once pt goes home and she can work up to outpt.  Pt and sister agreed.  Explained to them that tlso brace is on order and rep should be in this evening to fit pt.  Once pt is fitted then she will need xrays and then PT will be able to see her which is planned for tomorrow.  Rebecca Pandya, MSN

## 2018-05-10 NOTE — PROGRESS NOTES
Patient: Ofe Arzola  Location: [facility]  MRN: [id number]  Today's date: 5/10/2018      Patient  Returned to seated in bedside chair over incontinence pad and draw sheet, needs in reach, nursing notified .         Pain/Vitals     Row Name 05/10/18 1722 05/10/18 1754       Pain/Comfort/Sleep    Presence of Pain complains of pain/discomfort complains of pain/discomfort    Preferred Pain Scale  -- word (verbal rating pain scale)    Pain Body Location - Orientation lower lower    Pain Body Location back back    Pain Rating: Rest 4 - moderate pain  --    Pain Rating: Activity  -- 6 - moderate-severe pain    Pain Management Interventions position adjusted;premedicated for activity premedicated for activity;pillow support provided;position adjusted       Vital Signs    Pulse 61 (!)  55    SpO2 100 % 99 %    Patient Activity At rest --   after activity     Oxygen Therapy None (Room air)  --    /60 129/71    BP Location Right upper arm Right upper arm    BP Method Automatic Automatic    Patient Position Lying Sitting          Prior Living Environment  Lives With: alone  Living Arrangements: house  Home Accessibility: stairs to enter home (10 steps  through main  entrance )  Stair Railings at Home: inside, present at both sides  Transportation Available: car  Living Environment Comment:  (1/2 bath on main level /7+7 stairs  vs stairglide  to 2nd fl)Number of Stairs, Main Entrance: ten  Stair Railings, Main Entrance: railings on both sides of stairsEquipment Currently Used at Home: none       Prior Level of Function  Ambulation: independent  Transferring: independent  Toileting: independent  Bathing: independent  Dressing: independent  Eating: independent  Communication: understands/communicates without difficulty  Swallowing: swallows foods/liquids without difficulty  Equipment Currently Used at Home: none  Prior Functional Level Comment:  ( has stairglide, bathroom GBs and  rollator  at home )Dominant Hand: right          OT Evaluation - 05/10/18 1747        Session Details    Document Type initial evaluation    Mode of Treatment occupational therapy    Patient/Family Observations --   sister  and [facility] visiting        Time Calculation    Start Time 1719    Stop Time 1747    Time Calculation (min) 28 min       General Information    Patient Profile Reviewed? yes    Onset of Illness/Injury or Date of Surgery 05/09/18    Referring Physician --   trauma    General Observations of Patient --   alert,awake , cooperative     Pertinent History of Current Functional Problem --   fell from ladder sustained L3 endplate fx, scalp hematoma    Hearing Precautions/Limitations WNL    Existing Precautions/Restrictions brace worn when out of bed       Orientation Log    City 3-->spontaneous/free recall    Kind of Place 3-->spontaneous/free recall    Name of [facility] 3-->spontaneous/free recall    Month 3-->spontaneous/free recall    Date 3-->spontaneous/free recall    Year 3-->spontaneous/free recall    Day of Week 3-->spontaneous/free recall    Clock Time 3-->spontaneous/free recall    Etiology/Event 3-->spontaneous/free recall    Pathology Deficits 3-->spontaneous/free recall    Total Score 30       Cognition/Psychosocial    Safety Awareness intact       Attention    Quiet Environment WNL, no concerns       Follows Commands/Answers Questions    Follows Commands/Directions multi-step commands followed 100% of time    Answers Questions Verbally complex questions answered without difficulty       Memory Assessment    Comment, Short Term Memory --   WNL    Comment, Long Term Memory WNL       Range of Motion (ROM)    General Range of Motion no range of motion deficits identified       Manual Muscle Testing (MMT)    General MMT Assessment no strength deficits identified    Comment --   5/5 RUE/ 4+/5 LUE (mild guarding )       Bed Chair  Transfer Training    Bed Mobility Assessment/Interventions rolling  left;supine to sit    Assistive Device (Bed Mobility) bed rails    Comment (Bed Mobility) --   bed flat log roll technique    Assistive Device (Transfers) --   none for few steps  to arm chair     Bed-Chair Transfers, [place] supervision    Sit-Stand Transfers, [place] modified independence;verbal cues    Verbal Cues (Sit-Stand Transfers) preparatory posture;safety    Stand-Sit Transfers, [place] modified independence;verbal cues   increased time     Verbal Cues (Stand-Sit Transfers) maintaining center of gravity over base of support;hand placement;maintaining precautions    Stand Pivot Transfers, [place] distant supervision    Roll Left, [place] supervision;verbal cues    Supine to Sit, [place] set up;distant supervision;verbal cues       Upper Body Dressing    Upper Body Dressing Tasks don;front-opening garment    Upper Body Dressing Self-Performance threads left arm, shirt;threads right arm, shirt;pulls shirt over head/around back;pulls shirt down/adjusts    [place] Assistance obtain clothes    Upper Body Dressing Position supported sitting    Upper Body [place] modified independence       Grooming    Self-Performance washes, rinses and dries face;washes, rinses and dries hands;brushes/bustos hair;oral care (brushing teeth, cleaning dentures    Grooming Position supported sitting    Grooming Setup Assistance obtain supplies    [place] independent       Eating    [place] feeding skills;finger foods;liquids to mouth;independent       Static Sitting Balance    [place], Unsupported Sitting modified independence    Sitting Position sitting on edge of bed    Time Able to Maintain Position 3 to 4 minutes       Static Standing Balance    [place], Unsupported Standing supervision;set up;verbal cues    Time Able to Maintain Position 1 to 2 minutes       Spinal Orthosis    Orthosis Type LSO (lumbar sacral orthosis)    Therapeutic Indications allow early motion;immobilize,  protect/position healing structures    Performance Skills able to don/doff orthosis independently;manages clothing over/under orthosis;coordination adequate for orthosis skills;continue orthosis skill training    Skin Assessment no signs of pressure or friction present    Compliance Issues --   provided  education for wear OOB    Activity Limitations/Precautions activity limitations explained    BLTs  to protect healing structures     Donning and [place] demonstrates proper doffing of orthosis;demonstrates proper donning of orthosis;training given, doffing orthosis;training given, donning orthosis       AM-PAC (TM) - ADL    Putting on and taking off regular lower body clothing? 3 - A Little    Bathing? 3 - A Little    Toileting? 3 - A Little    Putting on/taking off regular upper body clothing? 3 - A Little    How much help for taking care of personal grooming? 3 - A Little    Eating meals? 4 - None    AM-PAC (TM) ADL Score 19       OT Clinical Impression    Patient's Goals For Discharge take care of myself at home    Plan For Care Reviewed: Occupational Therapy OT plan for care discussed with patient    System Pathology/Pathophysiology Noted musculoskeletal    Impairments Found (OT Eval) gait, locomotion, and balance;joint integrity and mobility    Functional Limitations in Following Categories self-care;home management;work    Rehab Potential/Prognosis: Occupational Therapy good, to achieve stated therapy goals    OT Frequency of Treatment 3-5 times per week    Problem List: Occupational Therapy decreased flexibility;strength decreased;postural control impaired;pain    Anticipated Equipment Needs At Discharge reacher;shower chair    Expected Discharge Disposition home with home health    Daily Outcome Statement --    good tolerance for initial mobility                    Education provided this session. See the Patient Education summary report for full details.    OT Care Plan Goals    Flowsheet Row Most Recent  Value   Grooming Goal   Time to Achieve Goal: Grooming  by discharge   Goal Activity: Grooming  standing at bathroom sink    Level of [place] Goal: Grooming  modified independence   Position Used: Grooming  standing   LB Dressing Goal   Time to Achieve Goal: Lower Body Dressing  by discharge   Goal Activity: Lower Body Dressing  compensatory  strategies / adaptive aids as needed    Level of [place] Goal: Lower Body Dressing  modified independence   Toileting Goal   Time to Achieve Goal: Toileting  by discharge   Level of [place] Goal: Toileting  modified independence   UB Dressing Goal   Time to Achieve Goal: Upper Body Dressing  by discharge   Goal Activity: Upper Body Dressing  managing  clothing with LSO    Level of [place] Goal: Upper Body Dressing  modified independence

## 2018-05-11 ENCOUNTER — APPOINTMENT (INPATIENT)
Dept: RADIOLOGY | Facility: HOSPITAL | Age: 53
DRG: 552 | End: 2018-05-11
Payer: COMMERCIAL

## 2018-05-11 ENCOUNTER — APPOINTMENT (INPATIENT)
Dept: OCCUPATIONAL THERAPY | Facility: HOSPITAL | Age: 53
DRG: 552 | End: 2018-05-11
Payer: COMMERCIAL

## 2018-05-11 ENCOUNTER — APPOINTMENT (INPATIENT)
Dept: PHYSICAL THERAPY | Facility: HOSPITAL | Age: 53
DRG: 552 | End: 2018-05-11
Attending: PHYSICIAN ASSISTANT
Payer: COMMERCIAL

## 2018-05-11 LAB
ATRIAL RATE: 64
P AXIS: 38
PR INTERVAL: 138
QRS DURATION: 86
QT INTERVAL: 404
QTC CALCULATION(BAZETT): 416
R AXIS: 72
T WAVE AXIS: 66
VENTRICULAR RATE: 64

## 2018-05-11 PROCEDURE — G0378 HOSPITAL OBSERVATION PER HR: HCPCS

## 2018-05-11 PROCEDURE — 63700000 HC SELF-ADMINISTRABLE DRUG: Performed by: PHYSICIAN ASSISTANT

## 2018-05-11 PROCEDURE — 97535 SELF CARE MNGMENT TRAINING: CPT | Mod: GO

## 2018-05-11 PROCEDURE — 20000000 HC ROOM AND CARE ICU

## 2018-05-11 PROCEDURE — 97162 PT EVAL MOD COMPLEX 30 MIN: CPT | Mod: GP

## 2018-05-11 PROCEDURE — 99231 SBSQ HOSP IP/OBS SF/LOW 25: CPT | Performed by: NEUROLOGICAL SURGERY

## 2018-05-11 PROCEDURE — 72100 X-RAY EXAM L-S SPINE 2/3 VWS: CPT

## 2018-05-11 RX ADMIN — ACETAMINOPHEN 650 MG: 325 TABLET, FILM COATED ORAL at 21:05

## 2018-05-11 RX ADMIN — OXYCODONE HYDROCHLORIDE 10 MG: 5 TABLET ORAL at 10:00

## 2018-05-11 RX ADMIN — OXYCODONE HYDROCHLORIDE 5 MG: 5 TABLET ORAL at 21:04

## 2018-05-11 RX ADMIN — ACETAMINOPHEN 650 MG: 325 TABLET, FILM COATED ORAL at 00:15

## 2018-05-11 RX ADMIN — OXYCODONE HYDROCHLORIDE 5 MG: 5 TABLET ORAL at 05:21

## 2018-05-11 RX ADMIN — POTASSIUM CHLORIDE 40 MEQ: 10 TABLET, EXTENDED RELEASE ORAL at 05:23

## 2018-05-11 RX ADMIN — ACETAMINOPHEN 650 MG: 325 TABLET, FILM COATED ORAL at 05:23

## 2018-05-11 ASSESSMENT — COGNITIVE AND FUNCTIONAL STATUS - GENERAL
CLIMB 3 TO 5 STEPS WITH RAILING: 3 - A LITTLE
MOVING TO AND FROM BED TO CHAIR: 3 - A LITTLE
TOILETING: 3 - A LITTLE
HELP NEEDED FOR PERSONAL GROOMING: 4 - NONE
DRESSING REGULAR UPPER BODY CLOTHING: 3 - A LITTLE
HELP NEEDED FOR BATHING: 3 - A LITTLE
DRESSING REGULAR LOWER BODY CLOTHING: 3 - A LITTLE
EATING MEALS: 4 - NONE
STANDING UP FROM CHAIR USING ARMS: 3 - A LITTLE
WALKING IN HOSPITAL ROOM: 3 - A LITTLE

## 2018-05-11 NOTE — PROGRESS NOTES
Patient: Ofe Arzola  Location: [facility]  MRN: [id number]  Today's date: 5/11/2018    Pt left seated in chair on incont pad,call bell in reach, w/OT present.      Pain/Vitals - 05/11/18 1216        Pain/Comfort/Sleep    Presence of Pain complains of pain/discomfort    Preferred Pain Scale word (verbal rating pain scale)    Pain Body Location back    Pain Rating: Rest 2 - mild pain    Pain Rating: Activity 2 - mild pain    Quality aching;stabbing    Pain Management Interventions position adjusted       Vital Signs    Pulse (!)  59   59 sitting    BP (!)  107/58   108/58 sitting    BP Location Right upper arm    BP Method Automatic    Patient Position Lying          Prior Living Environment  Lives With: alone  Living Arrangements: house  Home Accessibility: stairs to enter home (10 steps  through main  entrance )  Stair Railings at Home: inside, present at both sides  Transportation Available: car  Living Environment Comment: multilevel home, 10 CARLOS w/R rail, FF in home w/B rails or stairglideNumber of Stairs, Main Entrance: ten  Stair Railings, Main Entrance: railings on both sides of stairsEquipment Currently Used at Home: none (has stair glide, rollator)       Prior Level of Function  Ambulation: independent  Transferring: independent  Toileting: independent  Bathing: independent  Dressing: independent  Eating: independent  Communication: understands/communicates without difficulty  Swallowing: swallows foods/liquids without difficulty  Equipment Currently Used at Home: none (has stair glide, rollator)  Prior Functional Level Comment: Ind PTA, works as housepainterDominant Hand: right          PT Evaluation - 05/11/18 1216        Session Details    Document Type initial evaluation    Mode of Treatment physical therapy       Time Calculation    Start Time 1154    Stop Time 1216    Time Calculation (min) 22 min       General Information    Patient Profile Reviewed? yes     Onset of Illness/Injury or Date of Surgery 05/09/18    Referring Physician trauma    General Observations of Patient supine in bed upon arrival    Pertinent History of Current Functional Problem fall from ladder, + L3 fx    Hearing Precautions/Limitations WNL    Existing Precautions/Restrictions brace worn when out of bed       Orientation Log    City 3-->spontaneous/free recall    Kind of Place 3-->spontaneous/free recall    Name of [facility] 3-->spontaneous/free recall    Month 3-->spontaneous/free recall    Date 3-->spontaneous/free recall    Year 3-->spontaneous/free recall    Day of Week 3-->spontaneous/free recall    Clock Time 3-->spontaneous/free recall    Etiology/Event 3-->spontaneous/free recall    Pathology Deficits 3-->spontaneous/free recall    Total Score 30       Cognition/Psychosocial    Safety Awareness intact       Attention    Behavioral Observations WNL, no concerns       Sensory    Sensory General Assessment no sensation deficits identified       Range of Motion (ROM)    General Range of Motion no range of motion deficits identified       Manual Muscle Testing (MMT)    Comment BLE at least 3/5, deferred resistive testing due to back pain       Bed Chair WC Transfer Training    Bed Mobility Assessment/Interventions supine to sit;rolling left    Comment (Bed Mobility) HOB flat, cues for log roll technique    Sit-Stand Transfers, [place] supervision    Verbal Cues (Sit-Stand Transfers) hand placement   pt donned LSO in sitting prior to standing    Stand-Sit Transfers, [place] supervision    Verbal Cues (Stand-Sit Transfers) hand placement    Roll Left, [place] modified independence    Supine to Sit, [place] modified independence       Gait Training    [place] supervision    Distance in Feet 180 feet    Gait Pattern Utilized step-through    Gait Deviations Identified right;left;decreased gait speed;decreased step length;narrow base of support    Comment amb w/RW x 40' fb  remainder of distance w/o AD. enocuragement required. gait fairly steady w/o AD., improved w/inc distance       Stairs Training    [place] close supervision    Stairs, Assistive Device railing    Number of Stairs 4    Comment cues for sidestep w/BUE on rail       Static Sitting Balance    [place], Unsupported Sitting modified independence    Sitting Position sitting on edge of bed    Time Able to Maintain Position 4 to 5 minutes       Static Standing Balance    [place], Supported Standing supervision    Assistive Device Utilized rolling walker    [place], Unsupported Standing supervision    Time Able to Maintain Position 4 to 5 minutes       AM-PAC (TM) - Mobility    Turning from your back to your side while in a flat bed without using bedrails? 4 - None    Moving from lying on your back to sitting on the side of a flat bed without using bedrails? 4 - None    Moving to and from a bed to a chair? 3 - A Little    Standing up from a chair using your arms? 3 - A Little    To walk in a hospital room? 3 - A Little    Climbing 3-5 steps with a railing? 3 - A Little    AM-PAC (TM) Mobility Score 20       PT Clinical Impression    Patient's Goals For Discharge return to all previous roles/activities    Plan For Care Reviewed: Physical Therapy PT plan for care discussed with patient    Impairments Found (PT Eval) gait, locomotion, and balance    Rehab Potential/Prognosis good, to achieve stated therapy goals    PT Frequency of Treatment 3-5 times per week    Problem List pain;impaired motor control;impaired coordination;impaired balance    Activity Limitations Related to Problem List ambulation not performed safely;functional mobility not performed adequately or safely for household activity;functional mobility not performed adequately or safely for community activity    Anticipated Equipment Needs at Discharge --   has rollator and stairglide at home    Expected Discharge Disposition home with home health     Daily Outcome Statement S for amb at this time, moves w/guarded posture and encouragement. able to don brace w/o assist        Instructed in use of LSO, as well as spinal precauitons.           Education provided this session. See the Patient Education summary report for full details.    PT Care Plan Goals    Flowsheet Row Most Recent Value   Stair Goal, PT   PT STG: Stairs  modified independence   STG Number of Stairs  10   PT STG Duration: Stairs  7 days or less      PT Care Plan Goals    Flowsheet Row Most Recent Value   Gait Training Goal   Time to Achieve Goal: Gait Training  by discharge   Level of [place] Goal: Gait Training  modified independence   Distance Goal: Gait Training (feet)  150 feet   Goal Transfer Training   Time to Achieve Goal: Transfer Training  by discharge   Goal Activity: Transfer Training  bed-to-chair/chair-to-bed, sit-to-stand/stand-to-sit   Transfer Training Goal, [place] Level  modified independence   Goal: Transfer Training  Ind bed mobility

## 2018-05-11 NOTE — PROGRESS NOTES
"Hd#1 s/p fall from 8-10 feet landing on back resulting in L3 compression fracture.  Did well overnight.  Describes pain as aching and nonradicular.  Tolerating brace. No weakness or numbness.     BP (!) 96/51   Pulse (!) 59   Temp 37 °C (98.6 °F) (Oral)   Resp 18   Ht 1.753 m (5' 9\")   Wt 70.3 kg (155 lb)   SpO2 100%   BMI 22.89 kg/m²     MAEW. Full strength. Sensation intact.     A/P 51yo F s/p fall with L3 fracture who fortunately remains neurologically intact     - Will need to obtain standing lumbar xrays to document stability of fracture before DC    - Brace when OOB for comfort     - Follow up with NSGY in 1month with lumbar xrays   "

## 2018-05-11 NOTE — PROGRESS NOTES
Patient: Ofe Arzola  Location: [facility]  MRN: [id number]  Today's date: 5/11/2018    Patient  Returned to seated in bedside chair over incontinence pad and draw sheet, needs in reach, nursing notified .           Pain/Vitals     Row Name 05/11/18 1216 05/11/18 1222       Pain/Comfort/Sleep    Presence of Pain complains of pain/discomfort complains of pain/discomfort    Preferred Pain Scale word (verbal rating pain scale) word (verbal rating pain scale)    Pain Body Location back back    Pain Rating: Rest 2 - mild pain 2 - mild pain    Pain Rating: Activity 2 - mild pain 2 - mild pain    Quality aching;stabbing  --    Pain Management Interventions position adjusted premedicated for activity;position adjusted       Vital Signs    Pulse (!)  59   59 sitting (!)  58    BP (!)  107/58   108/58 sitting (!)  108/58    BP Location Right upper arm Right upper arm    BP Method Automatic Automatic    Patient Position Lying Sitting          Prior Living Environment  Lives With: alone  Living Arrangements: house  Home Accessibility: stairs to enter home (10 steps  through main  entrance )  Stair Railings at Home: inside, present at both sides  Transportation Available: car  Living Environment Comment: multilevel home, 10 CARLOS w/R rail, FF in home w/B rails or stairglideNumber of Stairs, Main Entrance: ten  Stair Railings, Main Entrance: railings on both sides of stairsEquipment Currently Used at Home: none (has stair glide, rollator)       Prior Level of Function  Ambulation: independent  Transferring: independent  Toileting: independent  Bathing: independent  Dressing: independent  Eating: independent  Communication: understands/communicates without difficulty  Swallowing: swallows foods/liquids without difficulty  Equipment Currently Used at Home: none (has stair glide, rollator)  Prior Functional Level Comment: Ind PTA, works as housepainterDominant Hand: right          OT  Treatment Summary - 05/11/18 1224        Session Details    Document Type daily treatment    Mode of Treatment occupational therapy       Time Calculation    Start Time 1204    Stop Time 1223    Time Calculation (min) 19 min       General Information    Patient Profile Reviewed? yes    Existing Precautions/Restrictions brace worn when out of bed   LSO       Bed Chair WC Transfer Training    Sit-Stand Transfers, [place] supervision    Stand-Sit Transfers, [place] supervision    Stand Pivot Transfers, [place] supervision       Upper Body Dressing    Upper Body Dressing Tasks don;front-opening garment    Upper Body Dressing Position unsupported standing    Upper Body [place] supervision       Lower Body Dressing    Lower Body Dressing Tasks don;underwear    Lower Body Dressing Position unsupported sitting    Lower Body Dressing [place] distant supervision    Concerns (Lower Body Dressing) --    foot to hand to protect  spine       Spinal Orthosis    Orthosis Type LSO (lumbar sacral orthosis)    Performance Skills strength adequate for orthosis skills;able to don/doff orthosis independently    Donning and [place] demonstrates proper donning of orthosis;demonstrates proper doffing of orthosis       AM-PAC (TM) - ADL    Putting on and taking off regular lower body clothing? 3 - A Little    Bathing? 3 - A Little    Toileting? 3 - A Little    Putting on/taking off regular upper body clothing? 3 - A Little    How much help for taking care of personal grooming? 4 - None    Eating meals? 4 - None    AM-PAC (TM) ADL Score 20       OT Clinical Impression    Patient's Goals For Discharge take care of myself at home    Family Goals For Discharge patient able to provide self-care independently    Plan For Care Reviewed: Occupational Therapy patient voices agreement with OT plan for care    System Pathology/Pathophysiology Noted musculoskeletal    Impairments Found (OT Eval) muscle performance;joint  integrity and mobility    Functional Limitations in Following Categories work    Rehab Potential/Prognosis: Occupational Therapy good, to achieve stated therapy goals    Problem List: Occupational Therapy postural control impaired;pain    Activity Limitations Related to Problem List IADLs not performed adequately or safely    Anticipated Equipment Needs At Discharge shower chair    Expected Discharge Disposition home with home health    Daily Outcome Statement --   tolerance limited to pain                    Education provided this session. See the Patient Education summary report for full details.    OT Care Plan Goals    Flowsheet Row Most Recent Value   Grooming Goal   Time to Achieve Goal: Grooming  by discharge   Goal Activity: Grooming  standing at bathroom sink    Level of [place] Goal: Grooming  modified independence   Position Used: Grooming  standing   LB Dressing Goal   Time to Achieve Goal: Lower Body Dressing  by discharge   Goal Activity: Lower Body Dressing  compensatory  strategies / adaptive aids as needed    Level of [place] Goal: Lower Body Dressing  modified independence   Toileting Goal   Time to Achieve Goal: Toileting  by discharge   Level of [place] Goal: Toileting  modified independence   Goal Transfer Training   Time to Achieve Goal: Transfer Training  by discharge   Goal Activity: Transfer Training  bed-to-chair/chair-to-bed, sit-to-stand/stand-to-sit   Transfer Training Goal, [place] Level  modified independence   Goal: Transfer Training  Ind bed mobility   UB Dressing Goal   Time to Achieve Goal: Upper Body Dressing  by discharge   Goal Activity: Upper Body Dressing  managing  clothing with LSO    Level of [place] Goal: Upper Body Dressing  modified independence

## 2018-05-11 NOTE — PROGRESS NOTES
Met with pt in room to f/u dcp and needs.  Pt anticipating d/c home when medically stable.  Pt states her brother can stay with her one night.  Pt agreeable to hc services and prefers referral to [facility].  ecin referral completed.  Rebecca Pandya, MSN

## 2018-05-11 NOTE — PROGRESS NOTES
TRAUMA SURGERY DAILY PROGRESS NOTE     PATIENT NAME:  Ofe Arzola YOB: 1965    AGE:  52 y.o.  GENDER: female   MRN:  [id number]  PATIENT #: [id number]       CHIEF COMPLAINT   S/p Fall with L3 Fx    SUBJECTIVE   Pt states that her pain is mod controlled. Neuro intact.    REVIEW OF SYSTEMS   Review of Systems     No change in ROS since admission      VITAL SIGNS   Temperature: Temp (24hrs), Av.8 °C (98.3 °F), Min:36.6 °C (97.8 °F), Max:37.1 °C (98.7 °F)     BP Max:  Systolic (24hrs), Av , Min:86 , Max:131      BP Garcia:  Diastolic (24hrs), Av, Min:48, Max:71    Recent:  Patient Vitals for the past 4 hrs:   BP Pulse Resp SpO2   18 1216 (!) 107/58 (!) 59 - -   18 1146 - (!) 58 18 100 %   18 1100 - (!) 55 18 100 %        I/Os:  I/O this shift:  In: 240 [P.O.:240]  Out: -      MEDICATIONS     Current Facility-Administered Medications:   •  acetaminophen (TYLENOL) tablet 650 mg, 650 mg, oral, q4h PRN, CHIRAG Diaz, 650 mg at 18 0523  •  calcium gluconate 1,000 mg in sodium chloride 0.9 % 50 mL IVPB, 1 g, intravenous, PRN, CHIRAG Diaz  •  glucose chewable tablet 16-32 g of dextrose, 16-32 g of dextrose, oral, PRN **OR** dextrose 40 % oral gel 15-30 g of dextrose, 15-30 g of dextrose, oral, PRN **OR** glucagon (GLUCAGEN) injection 1 mg, 1 mg, intramuscular, PRN **OR** dextrose in water injection 12.5 g, 25 mL, intravenous, PRN, CHIRAG Diaz  •  magnesium sulfate 1 g in dextrose 5 % 50 mL IVPB, 1 g, intravenous, PRN **OR** magnesium sulfate in water IVPB premix 2 g, 2 g, intravenous, PRN, CHIRAG Diaz  •  ondansetron ODT (ZOFRAN-ODT) disintegrating tablet 4 mg, 4 mg, oral, q8h PRN **OR** ondansetron (ZOFRAN) injection 4 mg, 4 mg, intravenous, q8h PRN, NavarroCHIRAG Metcalf, 4 mg at 05/10/18 1214  •  oxyCODONE (ROXICODONE) immediate release tablet 5-10 mg, 5-10 mg, oral, q4h PRN, CHIRAG Diaz, 5 mg at 18 0521  •  potassium chloride  (KLOR-CON) tablet extended release 20 mEq, 20 mEq, oral, PRN **OR** potassium chloride (KLOR-CON) tablet extended release 40 mEq, 40 mEq, oral, PRN, 40 mEq at 05/11/18 0523 **OR** potassium chloride 20 mEq in 100 mL IVPB  (premix), 20 mEq, intravenous, PRN **OR** potassium chloride 40 mEq in sodium chloride 0.9 % 250 mL IVPB, 40 mEq, intravenous, PRN, CHIRAG Diaz    MEDICATIONS:  Infusions:          Scheduled:    PRN:     acetaminophen 650 mg q4h PRN   calcium gluconate 1 g PRN   glucose 16-32 g of dextrose PRN   Or     dextrose 15-30 g of dextrose PRN   Or     glucagon 1 mg PRN   Or     dextrose in water 25 mL PRN   magnesium sulfate 1 g PRN   Or     magnesium sulfate 2 g PRN   ondansetron ODT 4 mg q8h PRN   Or     ondansetron 4 mg q8h PRN   oxyCODONE 5-10 mg q4h PRN   potassium chloride 20 mEq PRN   Or     potassium chloride 40 mEq PRN   Or     potassium chloride 20 mEq PRN   Or     potassium chloride 40 mEq PRN        DIAGNOSTIC DATA   LABS:  No results found for this or any previous visit (from the past 24 hour(s)).    IMAGING:  I have reviewed the imaging completed within the last 24 hours.    PHYSICAL EXAM    Physical Exam   Constitutional: She is oriented to person, place, and time. She appears well-developed and well-nourished.   HENT:   Head: Normocephalic and atraumatic.   Eyes: EOM are normal. Pupils are equal, round, and reactive to light.   Neck: Normal range of motion. Neck supple. No tracheal deviation present.   Cardiovascular: Normal rate, regular rhythm, normal heart sounds and intact distal pulses.    Pulmonary/Chest: Effort normal and breath sounds normal. No stridor. No respiratory distress.   Abdominal: Soft. Bowel sounds are normal. She exhibits no distension. There is no tenderness. No hernia.   Musculoskeletal: Normal range of motion. She exhibits no tenderness.   Tenderness in lower back   Neurological: She is alert and oriented to person, place, and time. No sensory deficit. She  exhibits normal muscle tone.   Skin: Skin is warm and dry.   Psychiatric: She has a normal mood and affect. Her behavior is normal.        PROBLEM LIST     Patient Active Problem List   Diagnosis   • Lumbar verterbral fracture, traumatic (CMS/HCC) ([facility])   • Syncope         IMPRESSION/PLAN   52 y.o. y/o female s/p fall with L3 Fx   1. Cont multimodal pain control  2. LSO Brace when OOB   3. D/c planning      AUTHOR:  Mayito Goyal MD  Trauma Service pager #[phone], [phone]  5/11/2018  1:57 PM

## 2018-05-12 LAB
ANION GAP SERPL CALC-SCNC: 6 MEQ/L (ref 3–15)
BUN SERPL-MCNC: 13 MG/DL (ref 8–20)
CALCIUM SERPL-MCNC: 9.1 MG/DL (ref 8.9–10.3)
CHLORIDE SERPL-SCNC: 103 MMOL/L (ref 98–109)
CK SERPL-CCNC: 49 IU/L (ref 15–200)
CO2 SERPL-SCNC: 28 MMOL/L (ref 22–32)
CREAT SERPL-MCNC: 0.8 MG/DL (ref 0.6–1.1)
ERYTHROCYTE [DISTWIDTH] IN BLOOD BY AUTOMATED COUNT: 11.8 % (ref 11.7–14.4)
GFR SERPL CREATININE-BSD FRML MDRD: >60 ML/MIN/1.73M*2
GLUCOSE SERPL-MCNC: 130 MG/DL (ref 70–99)
HCT VFR BLDCO AUTO: 35.8 % (ref 35–45)
HGB BLD-MCNC: 12.2 G/DL (ref 11.8–15.7)
MCH RBC QN AUTO: 30.7 PG (ref 28–33.2)
MCHC RBC AUTO-ENTMCNC: 34.1 G/DL (ref 32.2–35.5)
MCV RBC AUTO: 89.9 FL (ref 83–98)
PDW BLD AUTO: 8.1 FL (ref 9.4–12.3)
PLATELET # BLD AUTO: 162 K/UL (ref 150–369)
POTASSIUM SERPL-SCNC: 3.8 MMOL/L (ref 3.6–5.1)
RBC # BLD AUTO: 3.98 M/UL (ref 3.93–5.22)
SODIUM SERPL-SCNC: 137 MMOL/L (ref 136–144)
TROPONIN I SERPL-MCNC: <0.02 NG/ML
WBC # BLD AUTO: 5.84 K/UL (ref 3.8–10.5)

## 2018-05-12 PROCEDURE — 85027 COMPLETE CBC AUTOMATED: CPT | Performed by: PHYSICIAN ASSISTANT

## 2018-05-12 PROCEDURE — 93005 ELECTROCARDIOGRAM TRACING: CPT | Performed by: PHYSICIAN ASSISTANT

## 2018-05-12 PROCEDURE — 25800000 HC PHARMACY IV SOLUTIONS: Performed by: PHYSICIAN ASSISTANT

## 2018-05-12 PROCEDURE — 82550 ASSAY OF CK (CPK): CPT | Performed by: PHYSICIAN ASSISTANT

## 2018-05-12 PROCEDURE — 80048 BASIC METABOLIC PNL TOTAL CA: CPT | Performed by: PHYSICIAN ASSISTANT

## 2018-05-12 PROCEDURE — 63700000 HC SELF-ADMINISTRABLE DRUG: Performed by: PHYSICIAN ASSISTANT

## 2018-05-12 PROCEDURE — G0378 HOSPITAL OBSERVATION PER HR: HCPCS

## 2018-05-12 PROCEDURE — 63700000 HC SELF-ADMINISTRABLE DRUG: Performed by: NURSE PRACTITIONER

## 2018-05-12 PROCEDURE — 36415 COLL VENOUS BLD VENIPUNCTURE: CPT | Performed by: PHYSICIAN ASSISTANT

## 2018-05-12 PROCEDURE — 63600000 HC DRUGS/DETAIL CODE: Performed by: PHYSICIAN ASSISTANT

## 2018-05-12 PROCEDURE — 20000000 HC ROOM AND CARE ICU

## 2018-05-12 PROCEDURE — 84484 ASSAY OF TROPONIN QUANT: CPT | Performed by: PHYSICIAN ASSISTANT

## 2018-05-12 RX ORDER — SENNOSIDES 8.6 MG/1
1 TABLET ORAL NIGHTLY
Status: DISCONTINUED | OUTPATIENT
Start: 2018-05-12 | End: 2018-05-15 | Stop reason: HOSPADM

## 2018-05-12 RX ORDER — DOCUSATE SODIUM 100 MG/1
100 CAPSULE, LIQUID FILLED ORAL 2 TIMES DAILY
Status: DISCONTINUED | OUTPATIENT
Start: 2018-05-12 | End: 2018-05-15 | Stop reason: HOSPADM

## 2018-05-12 RX ADMIN — SENNA 1 TABLET: 8.6 TABLET, FILM COATED ORAL at 21:07

## 2018-05-12 RX ADMIN — ACETAMINOPHEN 650 MG: 325 TABLET, FILM COATED ORAL at 18:02

## 2018-05-12 RX ADMIN — SODIUM CHLORIDE 1000 ML: 9 INJECTION, SOLUTION INTRAVENOUS at 09:18

## 2018-05-12 RX ADMIN — OXYCODONE HYDROCHLORIDE 5 MG: 5 TABLET ORAL at 18:02

## 2018-05-12 RX ADMIN — ACETAMINOPHEN 650 MG: 325 TABLET, FILM COATED ORAL at 04:06

## 2018-05-12 RX ADMIN — DOCUSATE SODIUM 100 MG: 100 CAPSULE, LIQUID FILLED ORAL at 20:49

## 2018-05-12 RX ADMIN — ACETAMINOPHEN 650 MG: 325 TABLET, FILM COATED ORAL at 12:39

## 2018-05-12 RX ADMIN — POTASSIUM CHLORIDE 20 MEQ: 750 TABLET, EXTENDED RELEASE ORAL at 16:07

## 2018-05-12 RX ADMIN — ONDANSETRON 4 MG: 2 INJECTION INTRAMUSCULAR; INTRAVENOUS at 18:12

## 2018-05-12 NOTE — ASSESSMENT & PLAN NOTE
The patient's had 2 syncopal episodes associated with this admission.  When she fell off a ladder she was picked up by the medics who subsequently stood her and probably related to the pain of her lumbar compression fracture she was syncopal.  Standing up here getting out of bed for her nurse her nurse noticed that she became pale and was again syncopal.  She was gotten back to bed without any injury.  Both of these episodes are associated with significant lumbar low back pain and suggest me vasovagal syncope.  She has not had any arrhythmias.  I do not think she requires any extensive workup.  I would keep her monitored while she is here.  For safety measures she should have someone with her when she is still experiencing pain and trying to change position.  She will either need somewhat at home or need to go to SNF.

## 2018-05-12 NOTE — CONSULTS
CARDIOLOGY CONSULT NOTE     REASON FOR CONSULT: Syncope ×2    CONSULT FROM: Dmitriy Tapia MD    HPI     Ofe Arzola is a 52 y.o. female who was admitted with    # cardiac risk factors: No cardiac risk factors  # no CAD/CHF-no history of CAD  #-No history of any cardiac arrhythmias    Echo: None  Cath: No history of cath    At baseline, the patient denies exertional chest pain, shortness of breath, orthopnea, PND, ankle edema, palpitations, or syncope.    The patient is now admitted with a fall from a ladder, L3 compression fracture and syncope once in the field when picked up by medics and once in the hospital    PAST MEDICAL HISTORY     Past Medical History:   Diagnosis Date   • Hives    • Syncope      Past Surgical History:   Procedure Laterality Date   • CHOLECYSTECTOMY         MEDICATIONS     MEDICATIONS:  Home medications-none        Inpatient Medications    •  acetaminophen, 650 mg, oral, q4h PRN  •  calcium gluconate, 1 g, intravenous, PRN  •  glucose, 16-32 g of dextrose, oral, PRN **OR** dextrose, 15-30 g of dextrose, oral, PRN **OR** glucagon, 1 mg, intramuscular, PRN **OR** dextrose in water, 25 mL, intravenous, PRN  •  magnesium sulfate, 1 g, intravenous, PRN **OR** magnesium sulfate, 2 g, intravenous, PRN  •  ondansetron ODT, 4 mg, oral, q8h PRN **OR** ondansetron, 4 mg, intravenous, q8h PRN  •  oxyCODONE, 5-10 mg, oral, q4h PRN  •  potassium chloride, 20 mEq, oral, PRN **OR** potassium chloride, 40 mEq, oral, PRN **OR** potassium chloride, 20 mEq, intravenous, PRN **OR** potassium chloride, 40 mEq, intravenous, PRN    ALLERGIES     Patient has no known allergies.    SOCIAL HISTORY      No smoking or alcohol    FAMILY HISTORY     No premature CAD    REVIEW OF SYSTEMS     Constitutional: denies weight gain and weight loss  HEENT: denies blurred vision and irritation sore throat and hoarseness  Respiratory: denies dyspnea on exertion and chest pain/tightness  Cardiovascular: denies chest  pain, dyspnea, orthopnea, PND, edema, palpitations, syncope  Gastrointestinal: denies nausea, vomiting and diarrhea  Genitourinary: denies painful urination and frequency  Integument: denies itching and dryness  Hematologic/lymphatic:  denies easy bruising and petechiae  Musculoskeletal: Lumbar back pain post fall from a ladder   neurological: denies vertigo and tremors  Behavioral/Psych: denies anxiety and depression  Endocrine: denies cold intolerance and heat intolerance    PHYSICAL EXAM     VITAL SIGNS:  Temp:  [36.7 °C (98 °F)-37 °C (98.6 °F)] 37 °C (98.6 °F)  Heart Rate:  [51-81] 69  Resp:  [16-18] 16  BP: ()/(53-72) 117/65    Intake/Output Summary (Last 24 hours) at 05/12/18 1616  Last data filed at 05/12/18 1500   Gross per 24 hour   Intake             1240 ml   Output             3125 ml   Net            -1885 ml     PHYSICAL EXAM:  General appearance: alert, appears stated age and cooperative  Head: without obvious abnormality  Eyes: PERRLA, extraocular movements intact  Neck: No JVD, carotid bruits, thyromegaly  Lungs: clear to auscultation bilaterally, no crackles or wheezing  Heart: regular rate and rhythm, S1-S2 normal, no murmurs, clicks, rubs or gallops, difficulty turning in bed due to back pain  Abdomen: soft, non-tender; bowel sounds normal; no masses  Extremities: no edema, peripheral pulses present  Skin: Skin color, texture, turgor normal. No rashes or lesions  Neurologic: Alert and oriented X 3, no focal deficits    LABS / IMAGING / EKG / TELEMETRY     LABS:    Results from last 7 days  Lab Units 05/12/18  0906 05/09/18  1757   SODIUM mmol/L 137 137   POTASSIUM mmol/L 3.8 3.2*   CHLORIDE mmol/L 103 108   CO2 mmol/L 28 22   BUN mg/dL 13 17   CREATININE mg/dL 0.8 0.8   AST IU/L  --  23   ALT IU/L  --  11   TROPONIN I ng/mL <0.02 <0.02       Results from last 7 days  Lab Units 05/12/18  1431 05/09/18  1757   WBC K/uL 5.84 12.56*   HEMOGLOBIN g/dL 12.2 12.9   HEMATOCRIT % 35.8 36.3    PLATELETS K/uL 162 202   INR INR  --  1.1     No results found for: HGBA1C, TSH  No results found for: CHOL, LDLCALC, HDL, TRIG  No results found for: BNP    IMAGING:      ECG: Sinus rhythm at 67/min.  No acute changes      TELEMETRY: Sinus rhythm, no ectopy     ASSESSMENT AND PLAN     PROBLEM LIST:  Active Problems:    Lumbar verterbral fracture, traumatic (CMS/HCC) (HCC)    Syncope      ASSESSMENT AND PLAN:    Syncope   Assessment & Plan    The patient's had 2 syncopal episodes associated with this admission.  When she fell off a ladder she was picked up by the medics who subsequently stood her and probably related to the pain of her lumbar compression fracture she was syncopal.  Standing up here getting out of bed for her nurse her nurse noticed that she became pale and was again syncopal.  She was gotten back to bed without any injury.  Both of these episodes are associated with significant lumbar low back pain and suggest me vasovagal syncope.  She has not had any arrhythmias.  I do not think she requires any extensive workup.  I would keep her monitored while she is here.  For safety measures she should have someone with her when she is still experiencing pain and trying to change position.  She will either need somewhat at home or need to go to SNF.        Lumbar verterbral fracture, traumatic (CMS/HCC) (HCC)   Assessment & Plan    Per trauma service.  She will probably require 2-3 months to heal                     Levon Odell MD  5/12/2018    Primary Care Doctor: Rose Mary Sanon MD

## 2018-05-12 NOTE — NURSING NOTE
Attempted to get patient OOB and complete orthostatics.  Lyin/70 HR 65 no symptoms  Sittin/73  HR  85 slight lightheadedness  Standing:  Unable to complete. Patient sympotomatic to point of near syncope.  Assisted patient back into bed.  Trauma PA alerted.  Will continue to monitor closely.

## 2018-05-12 NOTE — PROGRESS NOTES
TRAUMA SURGERY DAILY PROGRESS NOTE     PATIENT NAME:  Ofe Arzola        YOB: 1965  AGE:  52 y.o.     GENDER: female  MRN:  [id number]          PATIENT #: [id number]    CHIEF COMPLAINT   No chief complaint on file.      PRIMARY CARE PHYSICIAN   Rose Mary Sanon MD    SUBJECTIVE   Pain better.   HD stable.   No new pain or c/o.   Tolerating diet.   Wants to go home.     REVIEW OF SYSTEMS   Review of Systems    No change to review of systems.   VITAL SIGNS   Temperature: Temp (24hrs), Av.8 °C (98.2 °F), Min:36.7 °C (98 °F), Max:36.9 °C (98.5 °F)     BP Max:  Systolic (24hrs), Av , Min:94 , Max:132      BP Garcia:  Diastolic (24hrs), Av, Min:51, Max:72    Recent:  Patient Vitals for the past 4 hrs:   BP Temp Temp src Pulse Resp SpO2   18 0600 (!) 119/59 - - (!) 53 18 99 %   18 0400 (!) 112/56 36.9 °C (98.5 °F) Oral 72 18 95 %        I/Os:  I/O last 3 completed shifts:  In: 1070 [P.O.:1070]  Out: 900 [Urine:900]  No intake/output data recorded.     MEDICATIONS     Current Facility-Administered Medications:   •  acetaminophen (TYLENOL) tablet 650 mg, 650 mg, oral, q4h PRN, CHIRAG Diaz, 650 mg at 18 0406  •  calcium gluconate 1,000 mg in sodium chloride 0.9 % 50 mL IVPB, 1 g, intravenous, PRN, CHIRAG Diaz  •  glucose chewable tablet 16-32 g of dextrose, 16-32 g of dextrose, oral, PRN **OR** dextrose 40 % oral gel 15-30 g of dextrose, 15-30 g of dextrose, oral, PRN **OR** glucagon (GLUCAGEN) injection 1 mg, 1 mg, intramuscular, PRN **OR** dextrose in water injection 12.5 g, 25 mL, intravenous, PRN, CHIRAG Diaz  •  magnesium sulfate 1 g in dextrose 5 % 50 mL IVPB, 1 g, intravenous, PRN **OR** magnesium sulfate in water IVPB premix 2 g, 2 g, intravenous, PRN, NavarroCHIRAG Metcalf  •  ondansetron ODT (ZOFRAN-ODT) disintegrating tablet 4 mg, 4 mg, oral, q8h PRN **OR** ondansetron (ZOFRAN) injection 4 mg, 4 mg, intravenous, q8h PRN, CHIRAG Diaz, 4 mg  at 05/10/18 1214  •  oxyCODONE (ROXICODONE) immediate release tablet 5-10 mg, 5-10 mg, oral, q4h PRN, CHIRAG Diaz, 5 mg at 05/11/18 2104  •  potassium chloride (KLOR-CON) tablet extended release 20 mEq, 20 mEq, oral, PRN **OR** potassium chloride (KLOR-CON) tablet extended release 40 mEq, 40 mEq, oral, PRN, 40 mEq at 05/11/18 0523 **OR** potassium chloride 20 mEq in 100 mL IVPB  (premix), 20 mEq, intravenous, PRN **OR** potassium chloride 40 mEq in sodium chloride 0.9 % 250 mL IVPB, 40 mEq, intravenous, PRN, NavarroCHIRAG Metcalf    MEDICATIONS:  Infusions:          Scheduled:    PRN:     acetaminophen 650 mg q4h PRN   calcium gluconate 1 g PRN   glucose 16-32 g of dextrose PRN   Or     dextrose 15-30 g of dextrose PRN   Or     glucagon 1 mg PRN   Or     dextrose in water 25 mL PRN   magnesium sulfate 1 g PRN   Or     magnesium sulfate 2 g PRN   ondansetron ODT 4 mg q8h PRN   Or     ondansetron 4 mg q8h PRN   oxyCODONE 5-10 mg q4h PRN   potassium chloride 20 mEq PRN   Or     potassium chloride 40 mEq PRN   Or     potassium chloride 20 mEq PRN   Or     potassium chloride 40 mEq PRN        PHYSICAL EXAM    Physical Exam    NAD; AAO x 3  HER - no focal deficit.   RRR  CTAB/L  Soft, NT/ND - no tenderness whatsoever.   Pelvis stable.   Upper and lower extremities NVI.    IMPRESSION/PLAN   52 y.o. y/o female s/p fall.   1. Fall from ladder.   2. L3 fracture - nonoperative mgmt per neurosurgery.  Brace when out of bed.  Standing films reviewed - fracture stable.  Pain control.  Patient will follow-up with neurosurgery in 1 month with repeat X rays.   3. Tolerating regular diet.   4. PT/OT input appreciated.   5. Disposition - patient would like to be discharged today.       AUTHOR:  Dmitriy Tapia MD  Trauma Service pager #[phone], [phone]  5/12/2018  7:29 AM

## 2018-05-12 NOTE — PROGRESS NOTES
I was called to the bedside by the nurse Dara for a syncopal event.Evidently the patient was getting out of bed felt lightheaded and was helped back into bed by the nursing staff.  At no time did she fall to the floor strike or injure any part of her body.  Her vital signs revealed relative mild hypotension with a heart rate in the 60s.  She had no complaints of chest pain shortness of breath or cough.  And no other presyncopal prodrome.    Current vital signs blood pressure 135/70 heart rate is 70 revealing regular sinus rhythm with no ST segment changes on the monitor.  She is alert and oriented ×4 with a GCS of 15 moving all extremities without weakness.  Examination of her heart and lungs reveals regular rate and rhythm without S3-S4 murmur rub or gallop, and clear bilateral breath sounds auscultated bilaterally.  Her abdomen is soft nondistended nontender.  Her extremities reveal no cyanosis or edema.    Patient was fine this morning and had this brief episode of syncope lasting approximately 20 seconds.  Due to this new finding we will draw labs complete a CBC a BMP cardiac enzymes and troponins.  We will also get a 12-lead EKG and compared to the old study.  I have asked someone from Dr. Bustamante's group to come by and evaluate the patient.    I also ordered a liter of normal saline solution to run over 1 hour.    We will await the findings of cardiology and we considered her disposition for home later today.    Kulwinder Good PA-C

## 2018-05-13 ENCOUNTER — APPOINTMENT (INPATIENT)
Dept: PHYSICAL THERAPY | Facility: HOSPITAL | Age: 53
DRG: 552 | End: 2018-05-13
Payer: COMMERCIAL

## 2018-05-13 LAB
ERYTHROCYTE [DISTWIDTH] IN BLOOD BY AUTOMATED COUNT: 11.9 % (ref 11.7–14.4)
HCT VFR BLDCO AUTO: 38.8 % (ref 35–45)
HGB BLD-MCNC: 13.2 G/DL (ref 11.8–15.7)
MCH RBC QN AUTO: 30.9 PG (ref 28–33.2)
MCHC RBC AUTO-ENTMCNC: 34 G/DL (ref 32.2–35.5)
MCV RBC AUTO: 90.9 FL (ref 83–98)
PDW BLD AUTO: 8.4 FL (ref 9.4–12.3)
PLATELET # BLD AUTO: 185 K/UL (ref 150–369)
RBC # BLD AUTO: 4.27 M/UL (ref 3.93–5.22)
WBC # BLD AUTO: 5.91 K/UL (ref 3.8–10.5)

## 2018-05-13 PROCEDURE — 85027 COMPLETE CBC AUTOMATED: CPT | Performed by: PHYSICIAN ASSISTANT

## 2018-05-13 PROCEDURE — 20000000 HC ROOM AND CARE ICU

## 2018-05-13 PROCEDURE — 63700000 HC SELF-ADMINISTRABLE DRUG: Performed by: PHYSICIAN ASSISTANT

## 2018-05-13 PROCEDURE — 63700000 HC SELF-ADMINISTRABLE DRUG: Performed by: NURSE PRACTITIONER

## 2018-05-13 PROCEDURE — G0378 HOSPITAL OBSERVATION PER HR: HCPCS

## 2018-05-13 PROCEDURE — 93005 ELECTROCARDIOGRAM TRACING: CPT | Performed by: PHYSICIAN ASSISTANT

## 2018-05-13 PROCEDURE — 36415 COLL VENOUS BLD VENIPUNCTURE: CPT | Performed by: PHYSICIAN ASSISTANT

## 2018-05-13 PROCEDURE — 97530 THERAPEUTIC ACTIVITIES: CPT | Mod: GP

## 2018-05-13 PROCEDURE — 63600000 HC DRUGS/DETAIL CODE: Performed by: NURSE PRACTITIONER

## 2018-05-13 PROCEDURE — 25800000 HC PHARMACY IV SOLUTIONS: Performed by: PHYSICIAN ASSISTANT

## 2018-05-13 PROCEDURE — 25800000 HC PHARMACY IV SOLUTIONS: Performed by: NURSE PRACTITIONER

## 2018-05-13 RX ORDER — SODIUM CHLORIDE 9 MG/ML
INJECTION, SOLUTION INTRAVENOUS CONTINUOUS
Status: DISCONTINUED | OUTPATIENT
Start: 2018-05-13 | End: 2018-05-15

## 2018-05-13 RX ORDER — TRAMADOL HYDROCHLORIDE 50 MG/1
50-100 TABLET ORAL EVERY 6 HOURS PRN
Status: DISCONTINUED | OUTPATIENT
Start: 2018-05-13 | End: 2018-05-13

## 2018-05-13 RX ORDER — KETOROLAC TROMETHAMINE 15 MG/ML
15 INJECTION, SOLUTION INTRAMUSCULAR; INTRAVENOUS EVERY 6 HOURS PRN
Status: DISCONTINUED | OUTPATIENT
Start: 2018-05-13 | End: 2018-05-15

## 2018-05-13 RX ADMIN — KETOROLAC TROMETHAMINE 15 MG: 15 INJECTION, SOLUTION INTRAMUSCULAR; INTRAVENOUS at 20:20

## 2018-05-13 RX ADMIN — ACETAMINOPHEN 650 MG: 325 TABLET, FILM COATED ORAL at 20:07

## 2018-05-13 RX ADMIN — SODIUM CHLORIDE: 9 INJECTION, SOLUTION INTRAVENOUS at 20:18

## 2018-05-13 RX ADMIN — OXYCODONE HYDROCHLORIDE 5 MG: 5 TABLET ORAL at 07:43

## 2018-05-13 RX ADMIN — ACETAMINOPHEN 650 MG: 325 TABLET, FILM COATED ORAL at 03:31

## 2018-05-13 RX ADMIN — TRAMADOL HYDROCHLORIDE 50 MG: 50 TABLET, COATED ORAL at 12:55

## 2018-05-13 RX ADMIN — OXYCODONE HYDROCHLORIDE 5 MG: 5 TABLET ORAL at 03:30

## 2018-05-13 RX ADMIN — SODIUM CHLORIDE 500 ML: 9 INJECTION, SOLUTION INTRAVENOUS at 18:37

## 2018-05-13 RX ADMIN — SENNA 1 TABLET: 8.6 TABLET, FILM COATED ORAL at 22:00

## 2018-05-13 RX ADMIN — DOCUSATE SODIUM 100 MG: 100 CAPSULE, LIQUID FILLED ORAL at 20:07

## 2018-05-13 RX ADMIN — ACETAMINOPHEN 650 MG: 325 TABLET, FILM COATED ORAL at 07:43

## 2018-05-13 RX ADMIN — DOCUSATE SODIUM 100 MG: 100 CAPSULE, LIQUID FILLED ORAL at 09:14

## 2018-05-13 ASSESSMENT — COGNITIVE AND FUNCTIONAL STATUS - GENERAL
STANDING UP FROM CHAIR USING ARMS: 3 - A LITTLE
CLIMB 3 TO 5 STEPS WITH RAILING: 3 - A LITTLE
WALKING IN HOSPITAL ROOM: 3 - A LITTLE
MOVING TO AND FROM BED TO CHAIR: 3 - A LITTLE

## 2018-05-13 NOTE — PROGRESS NOTES
Patient: Ofe Arzola  Location: [facility]  MRN: [id number]  Today's date: 5/13/2018 Pt. In bed  with call bell in reach   Watch for orthostasis suggest A of 2 for Pt. Safety           Pain/Vitals - 05/13/18 1456        Pain/Comfort/Sleep    Pain Body Location back    Pain Rating (0-10): Activity 8       Vital Signs    BP (!)  89/50    Patient Position Sitting          Prior Living Environment  Lives With: alone  Living Arrangements: house  Home Accessibility: stairs to enter home (10 steps  through main  entrance )  Stair Railings at Home: inside, present at both sides  Transportation Available: car  Living Environment Comment: multilevel home, 10 CARLOS w/R rail, FF in home w/B rails or stairglideNumber of Stairs, Main Entrance: ten  Stair Railings, Main Entrance: railings on both sides of stairsEquipment Currently Used at Home: none (has stair glide, rollator)       Prior Level of Function  Ambulation: independent  Transferring: independent  Toileting: independent  Bathing: independent  Dressing: independent  Eating: independent  Communication: understands/communicates without difficulty  Swallowing: swallows foods/liquids without difficulty  Equipment Currently Used at Home: none (has stair glide, rollator)  Prior Functional Level Comment: Ind PTA, works as housepainterDominant Hand: right          PT Treatment Summary - 05/13/18 1455        Session Details    Document Type daily treatment    Mode of Treatment physical therapy       Time Calculation    Start Time 1440    Stop Time 1453    Time Calculation (min) 13 min       Bed Chair WC Transfer Training    Sit-Stand Transfers, [place] minimum assist (75% patient effort);1 person assist   Pt. standing extremely slow flexed Knees    Stand-Sit Transfers, [place] minimum assist (75% patient effort);1 person assist    Sit to Supine, [place] minimum assist (75% patient effort);2 person assist       Gait Training     [place] minimum assist (75% or more patient effort);1 person assist    Assistive Device walker, front-wheeled    Distance in Feet 3 feet    Comment Pt needing to use commode but c/o lightheadedness BP Low see vitals.       Spinal Orthosis    Orthosis Type LSO (lumbar sacral orthosis)       AM-PAC (TM) - Mobility    Turning from your back to your side while in a flat bed without using bedrails? 3 - A Little    Moving from lying on your back to sitting on the side of a flat bed without using bedrails? 3 - A Little    Moving to and from a bed to a chair? 3 - A Little    Standing up from a chair using your arms? 3 - A Little    To walk in a hospital room? 3 - A Little    Climbing 3-5 steps with a railing? 3 - A Little    AM-PAC (TM) Mobility Score 18       PT Clinical Impression    Plan For Care Reviewed: Physical Therapy PT plan for care discussed with patient    Impairments Found (PT Eval) gait, locomotion, and balance    Rehab Potential/Prognosis adequate, monitor progress closely    Problem List pain    Activity Limitations Related to Problem List ambulation not performed safely    Daily Outcome Statement BP drops with activity                    Education provided this session. See the Patient Education summary report for full details.    PT Care Plan Goals    Flowsheet Row Most Recent Value   Stair Goal, PT   PT STG: Stairs  modified independence   STG Number of Stairs  10   PT STG Duration: Stairs  7 days or less      PT Care Plan Goals    Flowsheet Row Most Recent Value   Gait Training Goal   Time to Achieve Goal: Gait Training  by discharge   Level of [place] Goal: Gait Training  modified independence   Distance Goal: Gait Training (feet)  150 feet   Goal Transfer Training   Time to Achieve Goal: Transfer Training  by discharge   Goal Activity: Transfer Training  bed-to-chair/chair-to-bed, sit-to-stand/stand-to-sit   Transfer Training Goal, [place] Level  modified independence   Goal: Transfer  Training  Ind bed mobility

## 2018-05-13 NOTE — NURSING NOTE
"PT at bedside.  PT's SBP dropped to high 80's and pt was symptomatic stating she was \"feeling woozy\".  Pt assisted safely back to bed. SBP remained low   Will continue to monitor closely.  "

## 2018-05-13 NOTE — NURSING NOTE
Patient OOB to recliner, with walker for assist.  Tolerated transfer well.  No vasovagal response.  Pt reminded to not hold her breath.  VSS  Chair alarm applied  Call bell within reach  Will continue to monitor closely.

## 2018-05-13 NOTE — NURSING NOTE
Patient assisted to bedside commode and c/o feeling nauseated and lightheaded.  Patient vomited approximately 500cc's and reported feeling better.  Patient assisted back to recliner.  VSS  Will continue to monitor closely.

## 2018-05-13 NOTE — SUBJECTIVE & OBJECTIVE
"Daily Progress Note    Subjective     Interval History: none.       Objective     Vital signs in last 24 hours:  Temp:  [36.7 °C (98 °F)-37 °C (98.6 °F)] 36.8 °C (98.3 °F)  Heart Rate:  [53-71] 53  Resp:  [16-20] 20  BP: (109-133)/(58-70) 116/58      Intake/Output Summary (Last 24 hours) at 05/13/18 0713  Last data filed at 05/13/18 0100   Gross per 24 hour   Intake             1410 ml   Output             3675 ml   Net            -2265 ml     Intake/Output this shift:  No intake/output data recorded.    Labs    No new labs.  I have reviewed the patients labs until the time of note; no clinical concerns    Imaging  Not applicable    VTE Assessment  Increased risk from baseline; VTE Prophylaxis as ordered      Physical Exam:  BP (!) 116/58   Pulse (!) 53   Temp 36.8 °C (98.3 °F)   Resp 20   Ht 1.753 m (5' 9\")   Wt 70.3 kg (155 lb)   SpO2 98%   BMI 22.89 kg/m²     General Appearance:    Alert, cooperative, no distress, appears stated age   Head:    Normocephalic, without obvious abnormality, atraumatic   Eyes:    PERRL, conjunctiva/corneas clear, EOM's intact, fundi     benign, both eyes   Ears:    Normal TM's and external ear canals, both ears   Nose:   Nares normal, septum midline, mucosa normal, no drainage     or     sinus tenderness   Throat:   Lips, mucosa, and tongue normal; teeth and gums normal   Neck:   Supple, symmetrical, trachea midline, no adenopathy;     thyroid:  no enlargement/tenderness/nodules; no carotid    bruit or JVD   Back:     Symmetric, no curvature, ROM normal, no CVA tenderness   Lungs:     Clear to auscultation bilaterally, respirations unlabored   Chest Wall:    No tenderness or deformity   Heart:    Regular rate and rhythm, S1 and S2 normal, no murmur, rub or          gallop   Breast Exam:    No tenderness, masses, or nipple abnormality   Abdomen:     Soft, non-tender, bowel sounds active all four quadrants,     no masses, no organomegaly   Genitalia:    Normal female without " lesion, discharge or tenderness   Rectal:    Normal tone, no masses or tenderness; guaiac negative stool   Extremities:   Extremities normal, atraumatic, no cyanosis or edema   Musculoskeletal:  Pulses:   No injury or deformity    2+ and symmetric all extremities   Skin:   Skin color, texture, turgor normal, no rashes or lesions   Lymph nodes:   Cervical, supraclavicular, and axillary nodes normal   Neurologic:    Behavior/  Emotional:   CNII-XII intact, normal strength, sensation and reflexes     throughout    Appropriate, cooperative

## 2018-05-13 NOTE — PROGRESS NOTES
"Daily Progress Note    Daily Progress Note    Subjective     Interval History: none.       Objective     Vital signs in last 24 hours:  Temp:  [36.7 °C (98 °F)-37 °C (98.6 °F)] 36.8 °C (98.3 °F)  Heart Rate:  [53-71] 53  Resp:  [16-20] 20  BP: (109-133)/(58-70) 116/58      Intake/Output Summary (Last 24 hours) at 05/13/18 0713  Last data filed at 05/13/18 0100   Gross per 24 hour   Intake             1410 ml   Output             3675 ml   Net            -2265 ml     Intake/Output this shift:  No intake/output data recorded.    Labs    No new labs.  I have reviewed the patients labs until the time of note; no clinical concerns    Imaging  Not applicable    VTE Assessment  Increased risk from baseline; VTE Prophylaxis as ordered      Physical Exam:  BP (!) 116/58   Pulse (!) 53   Temp 36.8 °C (98.3 °F)   Resp 20   Ht 1.753 m (5' 9\")   Wt 70.3 kg (155 lb)   SpO2 98%   BMI 22.89 kg/m²      General Appearance:    Alert, cooperative, no distress, appears stated age   Head:    Normocephalic, without obvious abnormality, atraumatic   Eyes:    PERRL, conjunctiva/corneas clear, EOM's intact, fundi     benign, both eyes   Ears:    Normal TM's and external ear canals, both ears   Nose:   Nares normal, septum midline, mucosa normal, no drainage     or     sinus tenderness   Throat:   Lips, mucosa, and tongue normal; teeth and gums normal   Neck:   Supple, symmetrical, trachea midline, no adenopathy;     thyroid:  no enlargement/tenderness/nodules; no carotid    bruit or JVD   Back:     Symmetric, no curvature, ROM normal, no CVA tenderness   Lungs:     Clear to auscultation bilaterally, respirations unlabored   Chest Wall:    No tenderness or deformity   Heart:    Regular rate and rhythm, S1 and S2 normal, no murmur, rub or          gallop   Breast Exam:    No tenderness, masses, or nipple abnormality   Abdomen:     Soft, non-tender, bowel sounds active all four quadrants,     no masses, no organomegaly   Genitalia:    " Normal female without lesion, discharge or tenderness   Rectal:    Normal tone, no masses or tenderness; guaiac negative stool   Extremities:   Extremities normal, atraumatic, no cyanosis or edema   Musculoskeletal:  Pulses:   No injury or deformity    2+ and symmetric all extremities   Skin:   Skin color, texture, turgor normal, no rashes or lesions   Lymph nodes:   Cervical, supraclavicular, and axillary nodes normal   Neurologic:    Behavior/  Emotional:   CNII-XII intact, normal strength, sensation and reflexes     throughout    Appropriate, cooperative         Assessment & Plan  Syncope   Assessment & Plan    Vaso vagal,  Needs pain meds prior to moving.  Poss dc if she does well today        Lumbar verterbral fracture, traumatic (CMS/[facility]) ([facility])   Assessment & Plan    Brace and ptot            Expected Discharge Date:  5/12/2018

## 2018-05-14 ENCOUNTER — APPOINTMENT (INPATIENT)
Dept: PHYSICAL THERAPY | Facility: HOSPITAL | Age: 53
DRG: 552 | End: 2018-05-14
Payer: COMMERCIAL

## 2018-05-14 LAB
ANION GAP SERPL CALC-SCNC: 5 MEQ/L (ref 3–15)
ATRIAL RATE: 51
ATRIAL RATE: 63
ATRIAL RATE: 67
BUN SERPL-MCNC: 18 MG/DL (ref 8–20)
CALCIUM SERPL-MCNC: 9.1 MG/DL (ref 8.9–10.3)
CHLORIDE SERPL-SCNC: 107 MMOL/L (ref 98–109)
CO2 SERPL-SCNC: 25 MMOL/L (ref 22–32)
CREAT SERPL-MCNC: 0.7 MG/DL (ref 0.6–1.1)
ERYTHROCYTE [DISTWIDTH] IN BLOOD BY AUTOMATED COUNT: 11.9 % (ref 11.7–14.4)
GFR SERPL CREATININE-BSD FRML MDRD: >60 ML/MIN/1.73M*2
GLUCOSE SERPL-MCNC: 98 MG/DL (ref 70–99)
HCT VFR BLDCO AUTO: 35.9 % (ref 35–45)
HGB BLD-MCNC: 12.2 G/DL (ref 11.8–15.7)
MAGNESIUM SERPL-MCNC: 1.8 MG/DL (ref 1.8–2.5)
MCH RBC QN AUTO: 30.7 PG (ref 28–33.2)
MCHC RBC AUTO-ENTMCNC: 34 G/DL (ref 32.2–35.5)
MCV RBC AUTO: 90.4 FL (ref 83–98)
P AXIS: 28
P AXIS: 63
P AXIS: 71
PDW BLD AUTO: 8.1 FL (ref 9.4–12.3)
PHOSPHATE SERPL-MCNC: 4.5 MG/DL (ref 2.4–4.7)
PLATELET # BLD AUTO: 178 K/UL (ref 150–369)
POTASSIUM SERPL-SCNC: 4.3 MMOL/L (ref 3.6–5.1)
PR INTERVAL: 140
PR INTERVAL: 146
PR INTERVAL: 148
QRS DURATION: 84
QRS DURATION: 90
QRS DURATION: 92
QT INTERVAL: 392
QT INTERVAL: 410
QT INTERVAL: 416
QTC CALCULATION(BAZETT): 383
QTC CALCULATION(BAZETT): 414
QTC CALCULATION(BAZETT): 419
R AXIS: 57
R AXIS: 64
R AXIS: 67
RBC # BLD AUTO: 3.97 M/UL (ref 3.93–5.22)
SODIUM SERPL-SCNC: 137 MMOL/L (ref 136–144)
T WAVE AXIS: 58
T WAVE AXIS: 61
T WAVE AXIS: 70
VENTRICULAR RATE: 51
VENTRICULAR RATE: 63
VENTRICULAR RATE: 67
WBC # BLD AUTO: 5.6 K/UL (ref 3.8–10.5)

## 2018-05-14 PROCEDURE — 63600000 HC DRUGS/DETAIL CODE: Performed by: NURSE PRACTITIONER

## 2018-05-14 PROCEDURE — 80048 BASIC METABOLIC PNL TOTAL CA: CPT | Performed by: NURSE PRACTITIONER

## 2018-05-14 PROCEDURE — 97116 GAIT TRAINING THERAPY: CPT | Mod: GP

## 2018-05-14 PROCEDURE — 63700000 HC SELF-ADMINISTRABLE DRUG: Performed by: PHYSICIAN ASSISTANT

## 2018-05-14 PROCEDURE — G0378 HOSPITAL OBSERVATION PER HR: HCPCS

## 2018-05-14 PROCEDURE — 93005 ELECTROCARDIOGRAM TRACING: CPT | Performed by: PHYSICIAN ASSISTANT

## 2018-05-14 PROCEDURE — 83735 ASSAY OF MAGNESIUM: CPT | Performed by: NURSE PRACTITIONER

## 2018-05-14 PROCEDURE — 63700000 HC SELF-ADMINISTRABLE DRUG: Performed by: NURSE PRACTITIONER

## 2018-05-14 PROCEDURE — 36415 COLL VENOUS BLD VENIPUNCTURE: CPT | Performed by: NURSE PRACTITIONER

## 2018-05-14 PROCEDURE — 84100 ASSAY OF PHOSPHORUS: CPT | Performed by: NURSE PRACTITIONER

## 2018-05-14 PROCEDURE — 25800000 HC PHARMACY IV SOLUTIONS: Performed by: NURSE PRACTITIONER

## 2018-05-14 PROCEDURE — 20600000 HC ROOM AND CARE INTERMEDIATE/TELEMETRY

## 2018-05-14 PROCEDURE — 97530 THERAPEUTIC ACTIVITIES: CPT | Mod: GP

## 2018-05-14 PROCEDURE — 85027 COMPLETE CBC AUTOMATED: CPT | Performed by: NURSE PRACTITIONER

## 2018-05-14 RX ADMIN — KETOROLAC TROMETHAMINE 15 MG: 15 INJECTION, SOLUTION INTRAMUSCULAR; INTRAVENOUS at 14:40

## 2018-05-14 RX ADMIN — SODIUM CHLORIDE: 9 INJECTION, SOLUTION INTRAVENOUS at 12:12

## 2018-05-14 RX ADMIN — KETOROLAC TROMETHAMINE 15 MG: 15 INJECTION, SOLUTION INTRAMUSCULAR; INTRAVENOUS at 02:45

## 2018-05-14 RX ADMIN — DOCUSATE SODIUM 100 MG: 100 CAPSULE, LIQUID FILLED ORAL at 09:35

## 2018-05-14 RX ADMIN — DOCUSATE SODIUM 100 MG: 100 CAPSULE, LIQUID FILLED ORAL at 22:07

## 2018-05-14 RX ADMIN — KETOROLAC TROMETHAMINE 15 MG: 15 INJECTION, SOLUTION INTRAMUSCULAR; INTRAVENOUS at 22:07

## 2018-05-14 RX ADMIN — ACETAMINOPHEN 650 MG: 325 TABLET, FILM COATED ORAL at 09:35

## 2018-05-14 RX ADMIN — SENNA 1 TABLET: 8.6 TABLET, FILM COATED ORAL at 22:07

## 2018-05-14 ASSESSMENT — COGNITIVE AND FUNCTIONAL STATUS - GENERAL
MOVING TO AND FROM BED TO CHAIR: 3 - A LITTLE
STANDING UP FROM CHAIR USING ARMS: 3 - A LITTLE
WALKING IN HOSPITAL ROOM: 3 - A LITTLE
CLIMB 3 TO 5 STEPS WITH RAILING: 3 - A LITTLE

## 2018-05-14 NOTE — PROGRESS NOTES
Discussed in trauma rounds.  When syncope improved then d/c.  Pt transferred to .  Plan dispo home with [facility] when medically stable.  [facility] updated via ecin on pt not being d/c'd yet.  Rebecca Pandya, MSN

## 2018-05-14 NOTE — PROGRESS NOTES
Patient: Ofe Arzola  Location: [facility] 4A 4017  MRN: [id number]  Today's date: 5/14/2018     Pt in bedside chair, alarmed, personal items and call bell in reach. VSS and NAD. Nurse aware.            Pain/Vitals     Row Name 05/14/18 1625 05/14/18 1626       Pain/Comfort/Sleep    Presence of Pain complains of pain/discomfort  --    Preferred Pain Scale number (Numeric Rating Pain Scale)  --    Pain Body Location back  --    Pain Rating (0-10): Rest 3  --    Pain Rating (0-10): Activity 3  --    Pain Management Interventions premedicated for activity;position adjusted  --       Vital Signs    Pulse 80 78    SpO2 98 %  --    /64 124/70   post activity     Patient Position Sitting Standing          Prior Living Environment  Lives With: alone  Living Arrangements: house  Home Accessibility: stairs to enter home (10 steps  through main  entrance )  Stair Railings at Home: inside, present at both sides  Transportation Available: car  Living Environment Comment: [facility] home, 10 CARLOS w/R rail, FF in home w/B rails or stairglideNumber of Stairs, Main Entrance: ten  Stair Railings, Main Entrance: railings on both sides of stairsEquipment Currently Used at Home: none (has stair glide, rollator)       Prior Level of Function  Ambulation: independent  Transferring: independent  Toileting: independent  Bathing: independent  Dressing: independent  Eating: independent  Communication: understands/communicates without difficulty  Swallowing: swallows foods/liquids without difficulty  Equipment Currently Used at Home: none (has stair glide, rollator)  Prior Functional Level Comment: Ind PTA, works as housepainterDominant Hand: right          PT Treatment Summary - 05/14/18 1625        Session Details    Document Type daily treatment    Mode of Treatment physical therapy       Time Calculation    Start Time 1603    Stop Time 1626    Time Calculation (min) 23 min       General Information    Patient Profile  Reviewed? yes    Existing Precautions/Restrictions fall;spinal   LSO brace OOB       Bed Chair WC Transfer Training    Sit-Stand Transfers, [place] supervision;verbal cues    Verbal Cues (Sit-Stand Transfers) safety    Stand-Sit Transfers, [place] supervision;verbal cues    Verbal Cues (Stand-Sit Transfers) safety       Car Transfer    [place] modified independence   Educated pt re: completion with higher v lower vehicles       Gait Analysis/Training    Gait/Stairs Locomotion Gait Training (Group);Stairs Training (Group)       Gait Training    [place] supervision;verbal cues    Assistive Device walker, front-wheeled    Distance in Feet 125 feet   2x125    Gait Pattern Utilized step-through    Gait Deviations Identified narrow base of support    Comment Seated rest break between distances. Cueing for erect posture and activity pacing as needed. Able to increase step length as training progressed. NAD and VSS.        Stairs Training    [place] supervision;verbal cues    Stairs, Assistive Device railing    Stairs, Handrail Location left side (ascending);right side (descending)    Number of Stairs 8    Ascending Stairs Technique step-to-step    Descending Stairs Technique step-to-step    Comment Lateral asc/desc for use of BUE assist on U/L HR.        Spinal Orthosis    Orthosis Type LSO (lumbar sacral orthosis)       AM-PAC (TM) - Mobility    Turning from your back to your side while in a flat bed without using bedrails? 3 - A Little    Moving from lying on your back to sitting on the side of a flat bed without using bedrails? 3 - A Little    Moving to and from a bed to a chair? 3 - A Little    Standing up from a chair using your arms? 3 - A Little    To walk in a hospital room? 3 - A Little    Climbing 3-5 steps with a railing? 3 - A Little    AM-PAC (TM) Mobility Score 18       PT Clinical Impression    Plan For Care Reviewed: Physical Therapy patient voices agreement with PT plan for care     Impairments Found (PT Eval) gait, locomotion, and balance    Functional Limitations in Following Categories (PT Eval) home management;community/leisure    Disability: Inability to Perform Actions/Activities of Required Roles community/leisure    Rehab Potential/Prognosis good, to achieve stated therapy goals    PT Frequency of Treatment 3-5 times per week    Problem List decreased strength;pain    Activity Limitations Related to Problem List functional mobility not performed adequately or safely for community activity    Anticipated Equipment Needs at Discharge other (see comments)   owns rollator, bedside commode    Expected Discharge Disposition home with home health;home with assist   friends to assist with heavier home tasks    Daily Outcome Statement Supervision level of A at this time which is a decline from PLOF however progressing well with recommended home with HC PT.                    Education provided this session. See the Patient Education summary report for full details.    PT Care Plan Goals    Flowsheet Row Most Recent Value   Stair Goal, PT   PT STG: Stairs  modified independence   STG Number of Stairs  10   PT STG Duration: Stairs  7 days or less      PT Care Plan Goals    Flowsheet Row Most Recent Value   Gait Training Goal   Time to Achieve Goal: Gait Training  by discharge   Level of Carver Goal: Gait Training  modified independence   Distance Goal: Gait Training (feet)  150 feet   Goal Transfer Training   Time to Achieve Goal: Transfer Training  by discharge   Goal Activity: Transfer Training  bed-to-chair/chair-to-bed, sit-to-stand/stand-to-sit   Transfer Training Goal, Carver Level  modified independence   Goal: Transfer Training  Ind bed mobility

## 2018-05-14 NOTE — NURSING NOTE
Patient oob to chair with brace on at 1045 with no nausea/vomiting and no signs of syncope (see ortho vitals).  RN gave report to Shawna ALFARO for patient to move to [id number] and niece is room for support

## 2018-05-14 NOTE — NURSING NOTE
Spoke with Trauma Pat and Kassie.  Updated on this patient re headache and feeling fuzzy 4 hrs post tramadol.  And still feeling of lightheaded dizziness during the day shift.   Bolus half finished.  Non to finish bolus and start NSS at 60 hr.  Change to toradol iv for pain control and added tylenol ATC.  Explained same to patient.

## 2018-05-14 NOTE — PROGRESS NOTES
TRAUMA SURGERY DAILY PROGRESS NOTE     PATIENT NAME:  Ofe Arzola        YOB: 1965  AGE:  52 y.o.     GENDER: female  MRN:  [id number]          PATIENT #: [id number]    CHIEF COMPLAINT   No chief complaint on file.      PRIMARY CARE PHYSICIAN   Rose Mary Sanon MD    SUBJECTIVE   HD stable.   No new pain or c/o.    REVIEW OF SYSTEMS   Review of Systems    No change to review of systems.   VITAL SIGNS   Temperature: Temp (24hrs), Av.7 °C (98 °F), Min:36.6 °C (97.8 °F), Max:36.8 °C (98.3 °F)     BP Max:  Systolic (24hrs), Av , Min:89 , Max:129      BP Garcia:  Diastolic (24hrs), Av, Min:50, Max:72    Recent:  Patient Vitals for the past 4 hrs:   BP Temp Temp src Pulse Resp SpO2   18 0609 (!) 109/57 - - (!) 52 - 96 %   18 0600 - - - (!) 53 - 97 %   18 0409 126/65 - - (!) 51 - 100 %   18 0400 - 36.8 °C (98.3 °F) Oral (!) 53 20 100 %        I/Os:  I/O last 3 completed shifts:  In: 312 [P.O.:210; I.V.:102]  Out: 2600 [Urine:2600]  I/O this shift:  In: 600 [I.V.:600]  Out: -      MEDICATIONS     Current Facility-Administered Medications:   •  acetaminophen (TYLENOL) tablet 650 mg, 650 mg, oral, q4h PRN, CHIRAG Diaz, 650 mg at 18  •  calcium gluconate 1,000 mg in sodium chloride 0.9 % 50 mL IVPB, 1 g, intravenous, PRN, CHIRAG Diaz  •  glucose chewable tablet 16-32 g of dextrose, 16-32 g of dextrose, oral, PRN **OR** dextrose 40 % oral gel 15-30 g of dextrose, 15-30 g of dextrose, oral, PRN **OR** glucagon (GLUCAGEN) injection 1 mg, 1 mg, intramuscular, PRN **OR** dextrose in water injection 12.5 g, 25 mL, intravenous, PRN, CHIRAG Diaz  •  docusate sodium (COLACE) capsule 100 mg, 100 mg, oral, BID, SHE Moseley, 100 mg at 18  •  ketorolac (TORADOL) injection 15 mg, 15 mg, intravenous, q6h PRN, SHE Moseley, 15 mg at 18  •  ondansetron ODT (ZOFRAN-ODT) disintegrating tablet 4 mg, 4  mg, oral, q8h PRN **OR** ondansetron (ZOFRAN) injection 4 mg, 4 mg, intravenous, q8h PRN, CHIRAG Diaz, 4 mg at 05/12/18 1812  •  potassium chloride (KLOR-CON) tablet extended release 20 mEq, 20 mEq, oral, PRN, 20 mEq at 05/12/18 1607 **OR** potassium chloride (KLOR-CON) tablet extended release 40 mEq, 40 mEq, oral, PRN, 40 mEq at 05/11/18 0523 **OR** potassium chloride 20 mEq in 100 mL IVPB  (premix), 20 mEq, intravenous, PRN **OR** potassium chloride 40 mEq in sodium chloride 0.9 % 250 mL IVPB, 40 mEq, intravenous, PRN, CHIRAG Diaz  •  senna (SENOKOT) tablet 1 tablet, 1 tablet, oral, Nightly, SHE Moseley, 1 tablet at 05/13/18 2200  •  sodium chloride 0.9 % infusion, , intravenous, Continuous, SHE Moseley, Last Rate: 60 mL/hr at 05/13/18 2018    MEDICATIONS:  Infusions:    sodium chloride 0.9 %  Last Rate: 60 mL/hr at 05/13/18 2018          Scheduled:   docusate sodium 100 mg oral BID   senna 1 tablet oral Nightly     PRN:     acetaminophen 650 mg q4h PRN   calcium gluconate 1 g PRN   glucose 16-32 g of dextrose PRN   Or     dextrose 15-30 g of dextrose PRN   Or     glucagon 1 mg PRN   Or     dextrose in water 25 mL PRN   ketorolac 15 mg q6h PRN   ondansetron ODT 4 mg q8h PRN   Or     ondansetron 4 mg q8h PRN   potassium chloride 20 mEq PRN   Or     potassium chloride 40 mEq PRN   Or     potassium chloride 20 mEq PRN   Or     potassium chloride 40 mEq PRN        PHYSICAL EXAM    Physical Exam    NAD; AAO x 3  RRR  CTAB/L  Soft, NT/ND - no R/G  HER  IMPRESSION/PLAN   52 y.o. y/o female s/p fall.   1. Fall.   2. L3 compression fracture - nonoperative mgmt.  Brace per neurosurgery.   3. Syncope - may be vasovagal and/or part of postconcussion syndrome.  She is getting IVF.  She should attempt to ambulate today.   4. PT/OT  5. Can go home when syncopal events stop.   6. Disposition      AUTHOR:  Dmitriy Tapia MD  Trauma Service pager #[phone]  5/14/2018  7:16 AM

## 2018-05-15 VITALS
RESPIRATION RATE: 18 BRPM | TEMPERATURE: 98.7 F | OXYGEN SATURATION: 100 % | SYSTOLIC BLOOD PRESSURE: 109 MMHG | HEIGHT: 69 IN | HEART RATE: 60 BPM | BODY MASS INDEX: 22.86 KG/M2 | WEIGHT: 154.32 LBS | DIASTOLIC BLOOD PRESSURE: 64 MMHG

## 2018-05-15 PROCEDURE — G0378 HOSPITAL OBSERVATION PER HR: HCPCS

## 2018-05-15 PROCEDURE — 63600000 HC DRUGS/DETAIL CODE: Performed by: NURSE PRACTITIONER

## 2018-05-15 PROCEDURE — 63700000 HC SELF-ADMINISTRABLE DRUG: Performed by: NURSE PRACTITIONER

## 2018-05-15 PROCEDURE — 200200 PR NO CHARGE: Performed by: NEUROLOGICAL SURGERY

## 2018-05-15 PROCEDURE — 63700000 HC SELF-ADMINISTRABLE DRUG: Performed by: PHYSICIAN ASSISTANT

## 2018-05-15 RX ORDER — DOCUSATE SODIUM 100 MG/1
100 CAPSULE, LIQUID FILLED ORAL 2 TIMES DAILY
Qty: 174 CAPSULE | Refills: 0
Start: 2018-05-15 | End: 2018-08-10

## 2018-05-15 RX ORDER — IBUPROFEN 600 MG/1
600 TABLET ORAL EVERY 6 HOURS PRN
Qty: 90 TABLET | Refills: 0
Start: 2018-05-15 | End: 2018-05-15

## 2018-05-15 RX ORDER — ONDANSETRON 4 MG/1
4 TABLET, ORALLY DISINTEGRATING ORAL EVERY 8 HOURS PRN
Qty: 20 TABLET | Refills: 0
Start: 2018-05-15 | End: 2018-06-14

## 2018-05-15 RX ORDER — IBUPROFEN 600 MG/1
600 TABLET ORAL EVERY 6 HOURS PRN
Qty: 90 TABLET | Refills: 0 | Status: SHIPPED | OUTPATIENT
Start: 2018-05-15 | End: 2018-06-14

## 2018-05-15 RX ORDER — ACETAMINOPHEN 325 MG/1
650 TABLET ORAL EVERY 4 HOURS PRN
Qty: 1 BOTTLE | Refills: 0
Start: 2018-05-15 | End: 2018-06-14

## 2018-05-15 RX ORDER — IBUPROFEN 600 MG/1
600 TABLET ORAL EVERY 6 HOURS PRN
Status: DISCONTINUED | OUTPATIENT
Start: 2018-05-15 | End: 2018-05-15 | Stop reason: HOSPADM

## 2018-05-15 RX ORDER — SENNOSIDES 8.6 MG/1
1 TABLET ORAL NIGHTLY
Qty: 87 TABLET | Refills: 0
Start: 2018-05-15 | End: 2018-08-10

## 2018-05-15 RX ORDER — MAGNESIUM SULFATE HEPTAHYDRATE 40 MG/ML
2 INJECTION, SOLUTION INTRAVENOUS ONCE
Status: COMPLETED | OUTPATIENT
Start: 2018-05-15 | End: 2018-05-15

## 2018-05-15 RX ADMIN — DOCUSATE SODIUM 100 MG: 100 CAPSULE, LIQUID FILLED ORAL at 09:01

## 2018-05-15 RX ADMIN — ACETAMINOPHEN 650 MG: 325 TABLET, FILM COATED ORAL at 04:29

## 2018-05-15 RX ADMIN — KETOROLAC TROMETHAMINE 15 MG: 15 INJECTION, SOLUTION INTRAMUSCULAR; INTRAVENOUS at 04:24

## 2018-05-15 RX ADMIN — IBUPROFEN 600 MG: 600 TABLET ORAL at 11:02

## 2018-05-15 RX ADMIN — ACETAMINOPHEN 650 MG: 325 TABLET, FILM COATED ORAL at 11:02

## 2018-05-15 RX ADMIN — MAGNESIUM SULFATE HEPTAHYDRATE 2 G: 40 INJECTION, SOLUTION INTRAVENOUS at 11:02

## 2018-05-15 NOTE — PROGRESS NOTES
TRAUMA SURGERY DAILY PROGRESS NOTE     PATIENT NAME:  Ofe Arzola        YOB: 1965  AGE:  52 y.o.     GENDER: female  MRN:  [id number]          PATIENT #: [id number]    CHIEF COMPLAINT   No chief complaint on file.      PRIMARY CARE PHYSICIAN   Rose Mary Sanon MD    SUBJECTIVE   HD stable.   No new pain or c/o.    REVIEW OF SYSTEMS   Review of Systems   All other systems reviewed and are negative.      No change to review of systems.   VITAL SIGNS   Temperature: Temp (24hrs), Av.9 °C (98.5 °F), Min:36.7 °C (98.1 °F), Max:37.1 °C (98.7 °F)     BP Max:  Systolic (24hrs), Av , Min:103 , Max:136      BP Garcia:  Diastolic (24hrs), Av, Min:45, Max:82    Recent:    Patient Vitals for the past 4 hrs:   BP Temp Temp src Resp SpO2   05/15/18 1123 109/64 37.1 °C (98.7 °F) Axillary 18 100 %        I/Os:  I/O last 3 completed shifts:  In: 2372 [P.O.:290; I.V.:]  Out: 1500 [Urine:1500]  I/O this shift:  In: 260 [P.O.:260]  Out: -      MEDICATIONS     Current Facility-Administered Medications:   •  acetaminophen (TYLENOL) tablet 650 mg, 650 mg, oral, q4h PRN, CHIRAG Diaz, 650 mg at 05/15/18 1102  •  calcium gluconate 1,000 mg in sodium chloride 0.9 % 50 mL IVPB, 1 g, intravenous, PRN, CHIRAG Diaz  •  glucose chewable tablet 16-32 g of dextrose, 16-32 g of dextrose, oral, PRN **OR** dextrose 40 % oral gel 15-30 g of dextrose, 15-30 g of dextrose, oral, PRN **OR** glucagon (GLUCAGEN) injection 1 mg, 1 mg, intramuscular, PRN **OR** dextrose in water injection 12.5 g, 25 mL, intravenous, PRN, CHIRAG Diaz  •  docusate sodium (COLACE) capsule 100 mg, 100 mg, oral, BID, SHE Moseley, 100 mg at 05/15/18 0901  •  ibuprofen (MOTRIN) tablet 600 mg, 600 mg, oral, q6h PRN, SHE Humphries, 600 mg at 05/15/18 1102  •  ondansetron ODT (ZOFRAN-ODT) disintegrating tablet 4 mg, 4 mg, oral, q8h PRN **OR** ondansetron (ZOFRAN) injection 4 mg, 4 mg, intravenous, q8h  PRN, CHIRAG Diaz, 4 mg at 05/12/18 1812  •  potassium chloride (KLOR-CON) tablet extended release 20 mEq, 20 mEq, oral, PRN, 20 mEq at 05/12/18 1607 **OR** potassium chloride (KLOR-CON) tablet extended release 40 mEq, 40 mEq, oral, PRN, 40 mEq at 05/11/18 0523 **OR** potassium chloride 20 mEq in 100 mL IVPB  (premix), 20 mEq, intravenous, PRN **OR** potassium chloride 40 mEq in sodium chloride 0.9 % 250 mL IVPB, 40 mEq, intravenous, PRN, CHIRAG Diaz  •  senna (SENOKOT) tablet 1 tablet, 1 tablet, oral, Nightly, SHE Moseley, 1 tablet at 05/14/18 2207    MEDICATIONS:  Infusions:          Scheduled:     docusate sodium 100 mg oral BID   senna 1 tablet oral Nightly     PRN:       acetaminophen 650 mg q4h PRN   calcium gluconate 1 g PRN   glucose 16-32 g of dextrose PRN   Or     dextrose 15-30 g of dextrose PRN   Or     glucagon 1 mg PRN   Or     dextrose in water 25 mL PRN   ibuprofen 600 mg q6h PRN   ondansetron ODT 4 mg q8h PRN   Or     ondansetron 4 mg q8h PRN   potassium chloride 20 mEq PRN   Or     potassium chloride 40 mEq PRN   Or     potassium chloride 20 mEq PRN   Or     potassium chloride 40 mEq PRN        PHYSICAL EXAM    Physical Exam    NAD; AAO x 3  RRR  CTAB/L  Soft, NT/ND - no R/G  HER  IMPRESSION/PLAN   52 y.o. y/o female s/p fall.   1. Fall.   2. L3 compression fracture - nonoperative mgmt.  Brace per neurosurgery.   3. Syncope - may be vasovagal and/or part of postconcussion syndrome.  Pt is doing much better with no more episodes.  4. PT/OT  5. Can go home when syncopal events stop.   6. Disposition      AUTHOR:  Ced Ortiz MD  Trauma Service pager #[phone], [phone]  5/15/2018  12:58 PM

## 2018-05-15 NOTE — DISCHARGE INSTR - DIET
Heart healthy diet with plentiful fruits, vegetables and water intake to encourage bowel motility.  Protein intake aids healing of fractured bones.

## 2018-05-15 NOTE — PROGRESS NOTES
Patient remains hospitalized for vasovagal episodes, has been fluid resuscitated, and doing much better.  Back pain is minimal and she denies any radicular pain or weakness.     She had standing lumbar xrays completed on 5/11 which show no reduction in height of the L3 vertebral body or new malalignment.  No need to repeat Xrays today.     Okay to be discharged to home from our standpoint.  She should wear the lumbar support brace when out of bed and ambulating.  We will request repeat xrays in 1month and follow-up with NSGY at that time.

## 2018-05-15 NOTE — DISCHARGE SUMMARY
Inpatient Discharge Summary    BRIEF OVERVIEW  Admitting Provider: Dmitriy Tapia MD  Discharge Provider: No att. providers found  Primary Care Physician at Discharge: Rose Mary Sanon [phone]      Admission Date: 5/9/2018     Discharge Date: 5/15/2018    Primary Discharge Diagnosis  No Principal Problem: There is no principal problem currently on the Problem List. Please update the Problem List and refresh.    Secondary Discharge Diagnosis       Discharge Disposition  Home   Code Status at Discharge: Full Code    Discharge Medications     Medication List      START taking these medications    acetaminophen 325 mg tablet  Commonly known as:  TYLENOL  Take 2 tablets (650 mg total) by mouth every 4 (four) hours as needed for moderate pain.     docusate sodium 100 mg capsule  Commonly known as:  COLACE  Take 1 capsule (100 mg total) by mouth 2 (two) times a day for 174 doses.     ibuprofen 600 mg tablet  Commonly known as:  MOTRIN  Take 1 tablet (600 mg total) by mouth every 6 (six) hours as needed for moderate pain.     ondansetron ODT 4 mg disintegrating tablet  Commonly known as:  ZOFRAN-ODT  Take 1 tablet (4 mg total) by mouth every 8 (eight) hours as needed for nausea or vomiting (Nausea/Vomiting).     senna 8.6 mg tablet  Commonly known as:  SENOKOT  Take 1 tablet by mouth nightly for 87 doses.           Where to Get Your Medications      These medications were sent to [facility]/pharmacy #[id number] - [place], [place] - [address]  [address] [facility] [address]    Phone:  [phone]   · ibuprofen 600 mg tablet     Information about where to get these medications is not yet available    Ask your nurse or doctor about these medications  · acetaminophen 325 mg tablet  · docusate sodium 100 mg capsule  · ondansetron ODT 4 mg disintegrating tablet  · senna 8.6 mg tablet         Active Issues Requiring Follow-up  Issue:    Responsible Individual:    What is Needed:     Follow-up Appointments Arranged: Yes     Outpatient Follow-Up  No future appointments.    Referrals and Follow-ups to Schedule     Ambulatory referral to Home Health       Please evaluate Ofe Arzola for admission to Home Health.    Disciplines requested: Physical Therapy and Occupational Therapy    Services to provide: Strengthening Exercises and Evaluate    Physician to follow patient's care (the person listed here will be responsible for signing ongoing orders): Other: Neurosurgeon    Requested Start of Care Date: Tomorrow    Special Instructions:       I attest that I or another qualified licensed provider saw Ofe Arzola 90 days prior to or 30 days post admission and this face to face encounter meets the necessary Home Health requirements. The face to face encounter occurred on (date) 5/15/2018.    The encounter with the patient was in whole, or in part, for the following medical condition, which is the primary reason for home health care. (List medical condition) Lumbar spine fracture    I certify that, based on my findings, the following services are medically necessary skilled home health services: Strengthening Exercises and Evaluate.     The clinical findings that support the need for home care and homebound status are Unstable lumbar spine fracture with need for brace and walker    Further, I certify that my clinical findings support this patient's homebound status (i.e. absences from home require considerable and taxing effort, are for health treatment, or for attendance at [employer] events; absences from home for nonmedical reasons are infrequent or are of relatively short duration). Homebound criteria are met because: gait instability with fall risk, leaving home increases pain.        Test Results Pending at Discharge      DETAILS OF HOSPITAL STAY    Presenting Problem/History of Present Illness  Lumbar verterbral fracture, traumatic (CMS/[facility]) ([facility]) [S32.009A]  Closed fracture  of third lumbar vertebra, unspecified fracture morphology, initial encounter (CMS/[facility]) ([facility]) [S32.039A]       Hospital Course  Admitted and brace for spine fx per Ns,  Vasovagal syncope noted and treated, cards eval of patient and discharge once clearing ptot    Operative Procedures Performed    Consults: cardiology  Procedures:    Pertinent Test Results:

## 2018-05-15 NOTE — DISCHARGE INSTRUCTIONS
For your back fracture:    Use your brace when out of bed    Follow up with your neurosurgeon (Dr uRiz) in 1month with lumbar xrays prior to that appointment    For your pain:    Continue tylenol as needed for pain    During the hospital you were evaluated for syncope (lightheadedness/fainting). This can be a reaction to pain from injury. You are cleared by cardiology for usual activity. Be cautious when transitioning out of bed.     Follow up with your family doctor in 1 week.

## 2018-05-15 NOTE — PROGRESS NOTES
Patient oob to chair today took a shower no lightheadness. Standing xrays ordered then likely home today with PT/OT if xrays reviewed by neurosurgery. OOB with brace for comfort ordered

## 2018-05-16 NOTE — UM PHYSICIAN REVIEW NOTE
53 yo woman admitted with fall from 8 foot ladder with subsequent episode of syncope prior to arrival to hospital and one episode after arrival and found to have non-op compression fracture. Treated with IV fluids and monitored. IQ not met. No appeal.

## 2018-05-17 DIAGNOSIS — S32.009A LUMBAR VERTERBRAL FRACTURE, TRAUMATIC: Primary | ICD-10-CM

## 2018-05-30 ENCOUNTER — TELEPHONE (OUTPATIENT)
Dept: NEUROSURGERY | Facility: CLINIC | Age: 53
End: 2018-05-30

## 2018-05-30 NOTE — TELEPHONE ENCOUNTER
Patient called RE disability forms, per Dr. Ruiz advised pt we are unable to fill out forms. I directed pt to call PCP and/or contact trauma from [place] to see if they are able help fill out forms.

## 2018-06-18 ENCOUNTER — HOSPITAL ENCOUNTER (OUTPATIENT)
Dept: RADIOLOGY | Facility: CLINIC | Age: 53
Discharge: HOME | End: 2018-06-18
Attending: NURSE PRACTITIONER
Payer: COMMERCIAL

## 2018-06-18 ENCOUNTER — OFFICE VISIT (OUTPATIENT)
Dept: NEUROSURGERY | Facility: CLINIC | Age: 53
End: 2018-06-18
Payer: COMMERCIAL

## 2018-06-18 VITALS
WEIGHT: 161.8 LBS | BODY MASS INDEX: 23.16 KG/M2 | SYSTOLIC BLOOD PRESSURE: 116 MMHG | HEIGHT: 70 IN | DIASTOLIC BLOOD PRESSURE: 64 MMHG | HEART RATE: 65 BPM

## 2018-06-18 DIAGNOSIS — S32.009A LUMBAR VERTERBRAL FRACTURE, TRAUMATIC: ICD-10-CM

## 2018-06-18 DIAGNOSIS — S32.009A LUMBAR VERTERBRAL FRACTURE, TRAUMATIC: Primary | ICD-10-CM

## 2018-06-18 PROCEDURE — 99213 OFFICE O/P EST LOW 20 MIN: CPT | Performed by: NURSE PRACTITIONER

## 2018-06-18 PROCEDURE — 72100 X-RAY EXAM L-S SPINE 2/3 VWS: CPT

## 2018-06-18 NOTE — PROGRESS NOTES
History and Physical    Chief Complaint:  L3 Compression Fracture.    HPI   Patient is a 52 y.o. female who presents for a follow-up visit at our [place] office.     Ms. Arzola is was seen as a hospital Trauma status post a fall. She stated that she works as a  and yesterday was on a ladder while working when the ladder gave out, causing her to fall approximately 8 feet.  She says she fell on her back and hit the back of her head.  Denies LOC but does say that when EMS came and tried to stand her up, she had a syncopal event.  She was hemodynamically stable but CT scan demonstrated lumbar fracture at L3.    Ms. Arzola has been wearing her LSO brace only when outside the home. I have encouraged and instructed to wear LSO when out of bed ambulating.      She continues to denie neck pain or radiating pain into the arms or legs.  She denies numbness or tingling in her extremities.  No bowel or bladder incontinence.  She does have low back pain associated with movement.    Medical History:   Past Medical History:   Diagnosis Date   • Hives    • Syncope        Surgical History:   Past Surgical History:   Procedure Laterality Date   • CHOLECYSTECTOMY         Social History:   Social History     Social History Narrative   • No narrative on file       Family History:   Family History   Problem Relation Age of Onset   • Other Mother    • Pancreatitis Father        Allergies: Patient has no known allergies.    Home Medications:  Not in a hospital admission.    Current Medications:  •  acetaminophen (TYLENOL ORAL)  •  docusate sodium  •  senna    Review of Systems: 14-point review of systems are negative except for the following pertinent positives: lower back discomfort.    Objective     Vital Signs for the last 24 hours:  Heart Rate:  [65] 65  BP: (116)/(64) 116/64    Physicial Exam    Awake, alert, oriented to person, place, time and situation; speech and fund of knowledge normal. PERRL, extra-ocular  movements intact, face symmetric, tongue protrudes to midline, no nystagmus or diplopia. Neck is supple w/ FROM, is nontender and no JVD. Chest CTA without rales. Heart has a regular rate and rhythm without murmur. Abdomen soft, NT, ND, + BS.     Neurological examination:  Motor:     RUE:  D  5/5, B  5/5, T 5/5, WE 5/5, HG 5/5, IH 5/5   LUE:  D  5/5, B  5/5, T 5/5, WE 5/5, HG 5/5, IH 5/5   RLE:  IP  5/5, Q  5/5, DF 5/5, EHL 5/5, PF 5/5   RLE:  IP  5/5, Q  5/5, DF 5/5, EHL 5/5, PF 5/5  Reflexes:  Normoreflexive, no Burns's or Babinski signs, no clonus  Sensory exam:  Intact to light touch, pain, temperature and JPS testing  Gait and posture normal.  No dysmetria.    Data Review: L-spine Xray  Stable L3 compression fracture with no malalignment.     Assessment/Plan   Ms. Arzola is a 52 year old female sustained a L3 compression fracture status post a fall. She is being treated conservatively. She will be immobilized in a LSO brace for a total of three months.  She will be away in [place] on family vacation for the next six weeks will not be able to have her second follow-up xray done. Upon returning she will have new lumbar xray done at six to eight weeks and follow-up with Dr. Ruiz.     Thank you for the opportunity to be involved in the care of Ms. Arzola.       Sincerely,      SHE Alvarez

## 2018-06-18 NOTE — LETTER
June 18, 2018     Rose Mary Sanon MD  [address] [facility] [address]    Patient: Ofe Arzola   YOB: 1965   Date of Visit: 6/18/2018       Dear Dr. Sanon:    Thank you for referring Ofe Arzola to me for evaluation. Below are my notes for this consultation.    If you have questions, please do not hesitate to call me. I look forward to following your patient along with you.         Sincerely,        SHE Cheung        CC: No Recipients  SHE Cheung  6/18/2018  1:11 PM  Signed  History and Physical    Chief Complaint:  L3 Compression Fracture.    HPI   Patient is a 52 y.o. female who presents for a follow-up visit at our [place] office.     Ms. Arzola is was seen as a hospital Trauma status post a fall. She stated that she works as a  and yesterday was on a ladder while working when the ladder gave out, causing her to fall approximately 8 feet.  She says she fell on her back and hit the back of her head.  Denies LOC but does say that when EMS came and tried to stand her up, she had a syncopal event.  She was hemodynamically stable but CT scan demonstrated lumbar fracture at L3.    Ms. Arzola has been wearing her LSO brace only when outside the home. I have encouraged and instructed to wear LSO when out of bed ambulating.      She continues to denie neck pain or radiating pain into the arms or legs.  She denies numbness or tingling in her extremities.  No bowel or bladder incontinence.  She does have low back pain associated with movement.    Medical History:   Past Medical History:   Diagnosis Date   • Hives    • Syncope        Surgical History:   Past Surgical History:   Procedure Laterality Date   • CHOLECYSTECTOMY         Social History:   Social History     Social History Narrative   • No narrative on file       Family History:   Family History   Problem Relation Age of Onset   • Other Mother    • Pancreatitis Father         Allergies: Patient has no known allergies.    Home Medications:  Not in a hospital admission.    Current Medications:  •  acetaminophen (TYLENOL ORAL)  •  docusate sodium  •  senna    Review of Systems: 14-point review of systems are negative except for the following pertinent positives: lower back discomfort.    Objective     Vital Signs for the last 24 hours:  Heart Rate:  [65] 65  BP: (116)/(64) 116/64    Physicial Exam    Awake, alert, oriented to person, place, time and situation; speech and fund of knowledge normal. PERRL, extra-ocular movements intact, face symmetric, tongue protrudes to midline, no nystagmus or diplopia. Neck is supple w/ FROM, is nontender and no JVD. Chest CTA without rales. Heart has a regular rate and rhythm without murmur. Abdomen soft, NT, ND, + BS.     Neurological examination:  Motor:     RUE:  D  5/5, B  5/5, T 5/5, WE 5/5, HG 5/5, IH 5/5   LUE:  D  5/5, B  5/5, T 5/5, WE 5/5, HG 5/5, IH 5/5   RLE:  IP  5/5, Q  5/5, DF 5/5, EHL 5/5, PF 5/5   RLE:  IP  5/5, Q  5/5, DF 5/5, EHL 5/5, PF 5/5  Reflexes:  Normoreflexive, no Burns's or Babinski signs, no clonus  Sensory exam:  Intact to light touch, pain, temperature and JPS testing  Gait and posture normal.  No dysmetria.    Data Review: L-spine Xray  Stable L3 compression fracture with no malalignment.     Assessment/Plan   Ms. Arzola is a 52 year old female sustained a L3 compression fracture status post a fall. She is being treated conservatively. She will be immobilized in a LSO brace for a total of three months.  She will be away in [place] on family vacation for the next six weeks will not be able to have her second follow-up xray done. Upon returning she will have new lumbar xray done at six to eight weeks and follow-up with Dr. Ruiz.     Thank you for the opportunity to be involved in the care of Ms. Arzola.       Sincerely,      SHE Alvarez

## 2018-08-10 ENCOUNTER — TELEPHONE (OUTPATIENT)
Dept: NEUROSURGERY | Facility: CLINIC | Age: 53
End: 2018-08-10

## 2018-08-10 NOTE — TELEPHONE ENCOUNTER
LM Pt was informed:    Appt on 8/13/2018 in [facility]  Arrive 15 mins prior  Have xray done and bring radiology disc

## 2018-08-13 ENCOUNTER — OFFICE VISIT (OUTPATIENT)
Dept: NEUROSURGERY | Facility: CLINIC | Age: 53
End: 2018-08-13
Payer: COMMERCIAL

## 2018-08-13 ENCOUNTER — HOSPITAL ENCOUNTER (OUTPATIENT)
Dept: RADIOLOGY | Facility: CLINIC | Age: 53
Discharge: HOME | End: 2018-08-13
Attending: NURSE PRACTITIONER
Payer: COMMERCIAL

## 2018-08-13 VITALS
SYSTOLIC BLOOD PRESSURE: 120 MMHG | HEIGHT: 70 IN | TEMPERATURE: 97.5 F | HEART RATE: 73 BPM | DIASTOLIC BLOOD PRESSURE: 72 MMHG | BODY MASS INDEX: 22.99 KG/M2 | WEIGHT: 160.6 LBS

## 2018-08-13 DIAGNOSIS — S32.030D CLOSED COMPRESSION FRACTURE OF L3 LUMBAR VERTEBRA WITH ROUTINE HEALING, SUBSEQUENT ENCOUNTER: Primary | ICD-10-CM

## 2018-08-13 DIAGNOSIS — S32.009A LUMBAR VERTERBRAL FRACTURE, TRAUMATIC: ICD-10-CM

## 2018-08-13 PROCEDURE — 72110 X-RAY EXAM L-2 SPINE 4/>VWS: CPT

## 2018-08-13 PROCEDURE — 99212 OFFICE O/P EST SF 10 MIN: CPT | Performed by: NEUROLOGICAL SURGERY

## 2018-08-13 NOTE — PROGRESS NOTES
Visit Date: 2018           Referring Provider: No ref. provider found    RE: Ofe Arzola  : 1965  Subjective   CHIEF COMPLAINT: No chief complaint on file.    Ofe Arzola is a 52 y.o. female presenting today as a follow-up visit for her L3 compression fracture.  Ms. Arzola was seen as a hospital Trauma on May 10, 2018 status post a fall . She stated that she works as a  and was on a ladder while working when the ladder gave out, causing her to fall approximately 8 feet.  She says she fell on her back and hit the back of her head.  Denied LOC but does say that when EMS came and tried to stand her up, she had a syncopal event.  She was hemodynamically stable but CT scan demonstrated lumbar fracture at L3.     Ms. Arzola had originally been wearing her LSO brace only when outside the home but when she was seen in our clinic in  we recommended that she wear the LSO brace at all times when out of bed ambulating.    She returns today for follow-up visit     She continues to deny neck pain or radiating pain into the arms or legs.  She denies numbness or tingling in her extremities.  She is not reporting any lower back pain but instead she feels occasional tightness.  No bowel or bladder incontinence.      PAST MEDICAL HISTORY:  has a past medical history of Hives and Syncope.  PAST SURGICAL HISTORY:  has a past surgical history that includes Cholecystectomy.  MEDICATIONS: has a current medication list which includes the following prescription(s): acetaminophen.  ALLERGIES: has No Known Allergies.  FAMILY HISTORY: family history includes Other in her mother; Pancreatitis in her father.  SOCIAL HISTORY:  reports that she has never smoked. She has never used smokeless tobacco. She reports that she drinks alcohol. She reports that she does not use drugs.  Objective   PHYSICAL EXAM:  There were no vitals taken for this visit.    Physical Exam   Constitutional: She is oriented to  person, place, and time. She appears well-developed and well-nourished.   HENT:   Head: Normocephalic and atraumatic.   Eyes: EOM are normal.   Neck: Neck supple. No tracheal deviation present.   Pulmonary/Chest: Effort normal.   Abdominal: Soft. There is no tenderness.   Musculoskeletal: Normal range of motion.   Neurological: She is alert and oriented to person, place, and time. She has normal strength. Gait normal.   Psychiatric: She has a normal mood and affect. Judgment normal.   Vitals reviewed.      Neurologic Exam     Mental Status   Oriented to person, place, and time.     Cranial Nerves     CN III, IV, VI   Extraocular motions are normal.     CN VII   Facial expression full, symmetric.     CN XI   CN XI normal.     CN XII   CN XII normal.     Motor Exam     Strength   Strength 5/5 throughout.     Sensory Exam   Light touch normal.     Gait, Coordination, and Reflexes     Gait  Gait: normal    Reflexes   Right Burns: absent  Left Burns: absent  Right ankle clonus: absent  Left ankle clonus: absent        DATA REVIEW: Today I reviewed x-rays of the lumbar spine performed earlier today.  These x-rays demonstrate the L3 compression fracture with approximately 75% loss of vertebral body height.  I directly compared these to x-rays performed May and June which appear stable in the height of vertebral body.  Today's x-rays also were performed in flexion and extension and do not demonstrate any evidence of gross instability and there is no evidence of focal kyphosis.    ASSESSMENT/PLAN:  Assessment/Plan   Problem List Items Addressed This Visit     None          In summary, Ofe Arzola is a 52-year-old female who sustained an L3 compression fracture after a fall in May of this year.  She has been treated conservatively and maintained in an LSO brace at all times when out of bed.  At this point she is 3 months out from her injury and I am pleased to see that her x-rays demonstrate stability of the  degree of compression fracture with no development of focal kyphosis.  She is doing well neurologically with no appreciable symptoms.  Therefore I believe it is safe to take her out of the LSO brace and instead be used on an as needed or wanted basis.  I believe she is now safe to return to work and is limited in her activities only if performing specific activities result in the development of symptoms.  I would like to perform repeat x-rays in 3 months time to ensure stability once again.  I have ordered her for a DEXA bone density scan.  Her mother does have a history of osteoporosis and given this compression fracture I want to ensure that we do not have any abnormal bone density that would require additional medical treatment.  We will plan to see her back in my office in 3 months time for follow-up evaluation.         I want to thank you for the opportunity to participate in Ofe Arzola's care.   Please call my office should any questions or concerns arise.    Sincerely,      Camden Ruiz MD  08/13/18

## 2018-09-19 ENCOUNTER — TELEPHONE (OUTPATIENT)
Dept: NEUROSURGERY | Facility: CLINIC | Age: 53
End: 2018-09-19

## 2018-09-19 NOTE — TELEPHONE ENCOUNTER
Spoke with Ms. Arzola, reviewed results of her Dexa.   Dexa scan reports osteoporosis. Ms. Arzola' Mother has history of osteoporosis. Offered referral for Rheumatology or Endocrinology. She would like to discuss results with her PCP and get a referral in her area.

## 2018-11-09 ENCOUNTER — TELEPHONE (OUTPATIENT)
Dept: NEUROSURGERY | Facility: CLINIC | Age: 53
End: 2018-11-09

## 2018-11-12 ENCOUNTER — HOSPITAL ENCOUNTER (OUTPATIENT)
Dept: RADIOLOGY | Facility: CLINIC | Age: 53
Discharge: HOME | End: 2018-11-12
Attending: NEUROLOGICAL SURGERY
Payer: COMMERCIAL

## 2018-11-12 ENCOUNTER — OFFICE VISIT (OUTPATIENT)
Dept: NEUROSURGERY | Facility: CLINIC | Age: 53
End: 2018-11-12
Payer: COMMERCIAL

## 2018-11-12 VITALS
WEIGHT: 160.2 LBS | SYSTOLIC BLOOD PRESSURE: 106 MMHG | HEIGHT: 70 IN | OXYGEN SATURATION: 99 % | TEMPERATURE: 97.6 F | HEART RATE: 57 BPM | BODY MASS INDEX: 22.94 KG/M2 | DIASTOLIC BLOOD PRESSURE: 72 MMHG

## 2018-11-12 DIAGNOSIS — S32.030D CLOSED COMPRESSION FRACTURE OF L3 LUMBAR VERTEBRA WITH ROUTINE HEALING, SUBSEQUENT ENCOUNTER: ICD-10-CM

## 2018-11-12 DIAGNOSIS — S32.009A: Primary | ICD-10-CM

## 2018-11-12 PROCEDURE — 72100 X-RAY EXAM L-S SPINE 2/3 VWS: CPT

## 2018-11-12 PROCEDURE — 99213 OFFICE O/P EST LOW 20 MIN: CPT | Performed by: NEUROLOGICAL SURGERY

## 2018-11-12 NOTE — LETTER
2018     Rose Mary Sanon MD  [address] [facility] [address]    Patient: Ofe Arzola   YOB: 1965   Date of Visit: 2018       Dear Dr. Sanon:    Thank you for referring Ofe Arzola to me for evaluation. Below are my notes for this consultation.    If you have questions, please do not hesitate to call me. I look forward to following your patient along with you.         Sincerely,        Camden Ruiz MD        CC: No Recipients  Camden Ruiz MD  2018 11:45 AM  Signed  Visit Date: 2018           Referring Provider: No ref. provider found    RE: Ofe Arzola  : 1965  Subjective   CHIEF COMPLAINT: Results (X-Ray)    Ofe Arzola is a 52 y.o. female presenting today as a follow-up visit for her L3 compression fracture.  Ms. Arzola was seen as a hospital Trauma on May 10, 2018 status post a fall . She stated that she works as a  and was on a ladder while working when the ladder gave out, causing her to fall approximately 8 feet.  She says she fell on her back and hit the back of her head.  Denied LOC but does say that when EMS came and tried to stand her up, she had a syncopal event.  She was hemodynamically stable but CT scan demonstrated lumbar fracture at L3.     Ms. Arzola had originally been wearing her LSO brace only when outside the home but when she was seen in our clinic in  we recommended that she wear the LSO brace at all times when out of bed ambulating and she did that for 3 months following her accident.       She returns today for her follow-up visit now 6 months after her initial injury.  She continues to deny neck pain or radiating pain into the arms or legs.  She is experiencing a mild degree of lower back pain that she says is tolerable and has improved over the past couple months.  She denies numbness or tingling in her extremities.  No bowel or bladder incontinence.  She has  "returned back to full duties at work as a .  She did undergo bone density scan which revealed osteoporosis but has not been able to be seen by rheumatologist as of yet and has her appointment scheduled for January.  She has told me that she is on supplements and holistic medications for her osteoporosis and says she will discuss formal medical treatment for osteoporosis but at this point in time she elects to not take any formal medical treatment due to its side effects.      PAST MEDICAL HISTORY:  has a past medical history of Hives and Syncope.  PAST SURGICAL HISTORY:  has a past surgical history that includes Cholecystectomy.  MEDICATIONS: has a current medication list which includes the following prescription(s): acetaminophen.  ALLERGIES: has No Known Allergies.  FAMILY HISTORY: family history includes Other in her mother; Pancreatitis in her father.  SOCIAL HISTORY:  reports that she has never smoked. She has never used smokeless tobacco. She reports that she drinks alcohol. She reports that she does not use drugs.  Objective   PHYSICAL EXAM:  /72 (BP Location: Right upper arm, Patient Position: Sitting)   Pulse (!) 57   Temp 36.4 °C (97.6 °F)   Ht 1.765 m (5' 9.5\")   Wt 72.7 kg (160 lb 3.2 oz)   SpO2 99%   BMI 23.32 kg/m²      Physical Exam  Constitutional: She is oriented to person, place, and time. She appears well-developed and well-nourished.   HENT:   Head: Normocephalic and atraumatic.   Eyes: EOM are normal.   Neck: Neck supple. No tracheal deviation present.   Pulmonary/Chest: Effort normal.   Abdominal: Soft. There is no tenderness.   Musculoskeletal: Normal range of motion.   Neurological: She is alert and oriented to person, place, and time. She has normal strength. Gait normal.   Psychiatric: She has a normal mood and affect. Judgment normal.   Vitals reviewed.      Neurologic Exam  Mental Status   Oriented to person, place, and time.      Cranial Nerves      CN III, IV, VI "   Extraocular motions are normal.      CN VII   Facial expression full, symmetric.      CN XI   CN XI normal.      CN XII   CN XII normal.      Motor Exam      Strength   Strength 5/5 throughout.      Sensory Exam   Light touch normal.      Gait, Coordination, and Reflexes      Gait  Gait: normal     Reflexes   Right Burns: absent  Left Burns: absent  Right ankle clonus: absent  Left ankle clonus: absent      DATA REVIEW: Today I reviewed x-rays of the lumbar spine performed earlier today.  These x-rays do not have a formal radiology read that they do demonstrate the L3 compression fracture with approximately 60-70% loss of vertebral body height.  I directly compared these x-rays to the prior ones performed in May Komal and August and on my review they appear essentially stable in the high to the vertebral body.  There is no evidence of malalignment.    ASSESSMENT/PLAN:  Assessment/Plan   Problem List Items Addressed This Visit     Lumbar verterbral fracture, traumatic - Primary          In summary, Oef Arzola is a pleasant 52-year-old female who is now approximately 6 months out after sustaining an L3 compression fracture after a fall in May of this year.  She was treated with an LSO brace for 3 months and fortunately her x-rays demonstrate stability.  I am pleased to see that her x-rays today also shows stability even after she has stopped use of the LSO brace and return back to work.  She is doing well neurologically with no appreciable symptoms and has noted an improvement in her lower back pain.  Her DEXA bone density scan did demonstrate the presence of osteoporosis and I have informed her that it is my opinion that with this diagnosis in the setting of a spinal fracture that she should undergo medical treatment for osteoporosis.  She has elected to not pursue that treatment but is scheduled to see a rheumatologist in January for further discussion.  I would like her to undergo follow-up x-rays  in 3 months to confirm stability of the fracture.  She says that she would like to follow-up in the clinic only on an as-needed basis which I have set I believe is appropriate as long as she obtained the follow-up imaging that we recommend.  She can call my office with any questions or concerns.         I want to thank you for the opportunity to participate in Ofe Arzola's care.   Please call my office should any questions or concerns arise.    Sincerely,      Camden Ruiz MD  11/12/18

## 2018-11-12 NOTE — PROGRESS NOTES
Visit Date: 2018           Referring Provider: No ref. provider found    RE: Ofe Arzola  : 1965  Subjective   CHIEF COMPLAINT: Results (X-Ray)    Ofe Arzola is a 52 y.o. female presenting today as a follow-up visit for her L3 compression fracture.  Ms. Arzola was seen as a hospital Trauma on May 10, 2018 status post a fall . She stated that she works as a  and was on a ladder while working when the ladder gave out, causing her to fall approximately 8 feet.  She says she fell on her back and hit the back of her head.  Denied LOC but does say that when EMS came and tried to stand her up, she had a syncopal event.  She was hemodynamically stable but CT scan demonstrated lumbar fracture at L3.     Ms. Arzola had originally been wearing her LSO brace only when outside the home but when she was seen in our clinic in  we recommended that she wear the LSO brace at all times when out of bed ambulating and she did that for 3 months following her accident.       She returns today for her follow-up visit now 6 months after her initial injury.  She continues to deny neck pain or radiating pain into the arms or legs.  She is experiencing a mild degree of lower back pain that she says is tolerable and has improved over the past couple months.  She denies numbness or tingling in her extremities.  No bowel or bladder incontinence.  She has returned back to full duties at work as a .  She did undergo bone density scan which revealed osteoporosis but has not been able to be seen by rheumatologist as of yet and has her appointment scheduled for January.  She has told me that she is on supplements and holistic medications for her osteoporosis and says she will discuss formal medical treatment for osteoporosis but at this point in time she elects to not take any formal medical treatment due to its side effects.      PAST MEDICAL HISTORY:  has a past medical history of Hives and  "Syncope.  PAST SURGICAL HISTORY:  has a past surgical history that includes Cholecystectomy.  MEDICATIONS: has a current medication list which includes the following prescription(s): acetaminophen.  ALLERGIES: has No Known Allergies.  FAMILY HISTORY: family history includes Other in her mother; Pancreatitis in her father.  SOCIAL HISTORY:  reports that she has never smoked. She has never used smokeless tobacco. She reports that she drinks alcohol. She reports that she does not use drugs.  Objective   PHYSICAL EXAM:  /72 (BP Location: Right upper arm, Patient Position: Sitting)   Pulse (!) 57   Temp 36.4 °C (97.6 °F)   Ht 1.765 m (5' 9.5\")   Wt 72.7 kg (160 lb 3.2 oz)   SpO2 99%   BMI 23.32 kg/m²     Physical Exam  Constitutional: She is oriented to person, place, and time. She appears well-developed and well-nourished.   HENT:   Head: Normocephalic and atraumatic.   Eyes: EOM are normal.   Neck: Neck supple. No tracheal deviation present.   Pulmonary/Chest: Effort normal.   Abdominal: Soft. There is no tenderness.   Musculoskeletal: Normal range of motion.   Neurological: She is alert and oriented to person, place, and time. She has normal strength. Gait normal.   Psychiatric: She has a normal mood and affect. Judgment normal.   Vitals reviewed.      Neurologic Exam  Mental Status   Oriented to person, place, and time.      Cranial Nerves      CN III, IV, VI   Extraocular motions are normal.      CN VII   Facial expression full, symmetric.      CN XI   CN XI normal.      CN XII   CN XII normal.      Motor Exam      Strength   Strength 5/5 throughout.      Sensory Exam   Light touch normal.      Gait, Coordination, and Reflexes      Gait  Gait: normal     Reflexes   Right Burns: absent  Left Burns: absent  Right ankle clonus: absent  Left ankle clonus: absent      DATA REVIEW: Today I reviewed x-rays of the lumbar spine performed earlier today.  These x-rays do not have a formal radiology read that " they do demonstrate the L3 compression fracture with approximately 60-70% loss of vertebral body height.  I directly compared these x-rays to the prior ones performed in May [place] and August and on my review they appear essentially stable in the high to the vertebral body.  There is no evidence of malalignment.    ASSESSMENT/PLAN:  Assessment/Plan   Problem List Items Addressed This Visit     Lumbar verterbral fracture, traumatic - Primary          In summary, Ofe Arzola is a pleasant 52-year-old female who is now approximately 6 months out after sustaining an L3 compression fracture after a fall in May of this year.  She was treated with an LSO brace for 3 months and fortunately her x-rays demonstrate stability.  I am pleased to see that her x-rays today also shows stability even after she has stopped use of the LSO brace and return back to work.  She is doing well neurologically with no appreciable symptoms and has noted an improvement in her lower back pain.  Her DEXA bone density scan did demonstrate the presence of osteoporosis and I have informed her that it is my opinion that with this diagnosis in the setting of a spinal fracture that she should undergo medical treatment for osteoporosis.  She has elected to not pursue that treatment but is scheduled to see a rheumatologist in January for further discussion.  I would like her to undergo follow-up x-rays in 3 months to confirm stability of the fracture.  She says that she would like to follow-up in the clinic only on an as-needed basis which I have set I believe is appropriate as long as she obtained the follow-up imaging that we recommend.  She can call my office with any questions or concerns.         I want to thank you for the opportunity to participate in Ofe Arzola's care.   Please call my office should any questions or concerns arise.    Sincerely,      Camden Ruiz MD  11/12/18

## 2019-02-13 ENCOUNTER — HOSPITAL ENCOUNTER (OUTPATIENT)
Dept: RADIOLOGY | Age: 54
Discharge: HOME | End: 2019-02-13
Attending: NEUROLOGICAL SURGERY
Payer: COMMERCIAL

## 2019-02-13 DIAGNOSIS — S32.009A: ICD-10-CM

## 2019-02-13 PROCEDURE — 72100 X-RAY EXAM L-S SPINE 2/3 VWS: CPT

## 2019-02-26 ENCOUNTER — TELEPHONE (OUTPATIENT)
Dept: NEUROSURGERY | Facility: CLINIC | Age: 54
End: 2019-02-26

## 2019-02-26 NOTE — TELEPHONE ENCOUNTER
with history of osteoporosis and L3 compression fracture s/p a fall.  She is being treated conservatively. She will be immobilized in a LSO brace for a total of three months. Her most recent lumbar Xray shows a stable L3 fracture with no malalignment or instability.   Conveyed results to Ms. Arzola. No further imaging and follow-up as needed.

## 2022-06-30 ENCOUNTER — VBI (OUTPATIENT)
Dept: ADMINISTRATIVE | Facility: OTHER | Age: 57
End: 2022-06-30

## 2022-06-30 NOTE — TELEPHONE ENCOUNTER
Upon review of the In Basket request we were able to locate, review, and update the patient chart as requested for DEXA Scan, Mammogram and Pap Smear (HPV) aka Cervical Cancer Screening  Any additional questions or concerns should be emailed to the Practice Liaisons via [email or web address] email, please do not reply via In Basket      Thank you  Farida Tomas

## 2022-07-11 ENCOUNTER — ANNUAL EXAM (OUTPATIENT)
Dept: OBGYN CLINIC | Facility: CLINIC | Age: 57
End: 2022-07-11
Payer: COMMERCIAL

## 2022-07-11 VITALS
BODY MASS INDEX: 21.85 KG/M2 | SYSTOLIC BLOOD PRESSURE: 109 MMHG | WEIGHT: 152.6 LBS | DIASTOLIC BLOOD PRESSURE: 70 MMHG | HEIGHT: 70 IN

## 2022-07-11 DIAGNOSIS — E28.39 ESTROGEN DEFICIENCY: ICD-10-CM

## 2022-07-11 DIAGNOSIS — M85.80 OSTEOPENIA, UNSPECIFIED LOCATION: ICD-10-CM

## 2022-07-11 DIAGNOSIS — Z12.31 ENCOUNTER FOR MAMMOGRAM TO ESTABLISH BASELINE MAMMOGRAM: ICD-10-CM

## 2022-07-11 DIAGNOSIS — Z12.4 SCREENING FOR CERVICAL CANCER: ICD-10-CM

## 2022-07-11 DIAGNOSIS — Z01.419 ENCOUNTER FOR GYNECOLOGICAL EXAMINATION WITHOUT ABNORMAL FINDING: Primary | ICD-10-CM

## 2022-07-11 PROCEDURE — S0612 ANNUAL GYNECOLOGICAL EXAMINA: HCPCS | Performed by: OBSTETRICS & GYNECOLOGY

## 2022-07-11 RX ORDER — ALENDRONATE SODIUM 70 MG/1
70 TABLET ORAL DAILY
COMMUNITY
Start: 2022-06-01

## 2022-07-11 NOTE — PROGRESS NOTES
[address] [facility] [address], [address], [employer] [facility] [employer] [facility] [employer] [facility], [address] [facility]    ASSESSMENT/PLAN: Liv Steel is a 61 y o  Pablo Matthews who presents for annual gynecologic exam     Encounter for routine gynecologic examination  - Routine well woman exam completed today  - Cervical Cancer Screening: Current ASCCP Guidelines reviewed  Last Pap: 06/01/2022   Next Pap Due:   - COVID vaccine  Pfizer   X  2   - Contraceptive counseling discussed  Current contraception: none or condoms:   - Breast Cancer Screening: Last Mammogram 09/30/2020,   - Colorectal cancer screening was not ordered  Dw pt    - The following were reviewed in today's visit: breast self exam, menopause, osteoporosis, adequate intake of calcium and vitamin D, exercise and DEXA ordered    Additional problems addressed during this visit:  1  Encounter for gynecological examination without abnormal finding    2  Encounter for mammogram to establish baseline mammogram  -     Mammo screening bilateral w 3d & cad; Future    3  Osteopenia, unspecified location  -     IGP, rfx Aptima HPV ASCU  -     DXA bone density spine hip and pelvis; Future    4  Estrogen deficiency  Comments:  falls prevention, calcium [id number] mg  a day  wt bering exercise   Orders:  -     IGP, rfx Aptima HPV ASCU  -     DXA bone density spine hip and pelvis; Future    5  Screening for cervical cancer        CC:  Annual Gynecologic Examination    HPI: Liv Steel is a 61 y o  Pablo Matthews who presents for annual gynecologic examination  62 yo here for  Wellness exam   No bleeding  , bloating   Or changes in  Bowels or  Bladder    + hx of   Lumbar  Fracture  On  Fosamax    Not sexually active       The following portions of the patient's history were reviewed and updated as appropriate: She  has a past medical history of MVP (mitral valve prolapse) and Osteoporosis  She  has a past surgical history that includes Mammo (historical); Colonoscopy (11/2016);  DXA procedure(historical); and Cholecystectomy (1994)  Her family history is not on file  She  reports that she has never smoked  She has never used smokeless tobacco  She reports previous alcohol use  She reports that she does not use drugs  Current Outpatient Medications   Medication Sig Dispense Refill    alendronate (FOSAMAX) 70 mg tablet Take 70 mg by mouth in the morning       No current facility-administered medications for this visit  She has No Known Allergies       Review of Systems:  All systems normal except as noted in HPI          Objective:  /70 (BP Location: Left arm, Patient Position: Sitting, Cuff Size: Standard)   Ht 5' 9 5" (1 765 m)   Wt 69 2 kg (152 lb 9 6 oz)   BMI 22 21 kg/m²    Physical Exam  Vitals and nursing note reviewed  Constitutional:       Appearance: Normal appearance  HENT:      Head: Normocephalic  Cardiovascular:      Rate and Rhythm: Normal rate and regular rhythm  Pulses: Normal pulses  Heart sounds: Normal heart sounds  Pulmonary:      Effort: Pulmonary effort is normal       Breath sounds: Normal breath sounds  Chest:      Chest wall: No mass, lacerations, swelling, tenderness or edema  Breasts: Joao Score is 4  Breasts are symmetrical       Right: Normal  No swelling, bleeding, inverted nipple, mass, nipple discharge, skin change, tenderness, axillary adenopathy or supraclavicular adenopathy  Left: No swelling, bleeding, inverted nipple, mass, nipple discharge, skin change, tenderness, axillary adenopathy or supraclavicular adenopathy  Abdominal:      General: Abdomen is flat  Bowel sounds are normal       Palpations: Abdomen is soft  Genitourinary:     General: Normal vulva  Exam position: Lithotomy position  Pubic Area: No rash  Joao stage (genital): 4       Labia:         Right: No rash, tenderness or lesion  Left: No rash, tenderness or lesion         Urethra: No urethral pain, urethral swelling or urethral lesion  Vagina: Normal  No vaginal discharge, bleeding or prolapsed vaginal walls  Cervix: No cervical motion tenderness or discharge  Uterus: Normal        Adnexa: Right adnexa normal and left adnexa normal       Rectum: Normal       Comments: Atrophic   Musculoskeletal:         General: Normal range of motion  Cervical back: Neck supple  Lymphadenopathy:      Upper Body:      Right upper body: No supraclavicular, axillary or pectoral adenopathy  Left upper body: No supraclavicular, axillary or pectoral adenopathy  Lower Body: No right inguinal adenopathy  No left inguinal adenopathy  Skin:     General: Skin is warm and dry  Neurological:      General: No focal deficit present  Mental Status: She is alert and oriented to person, place, and time  Psychiatric:         Mood and Affect: Mood normal          Behavior: Behavior normal          Thought Content:  Thought content normal          Judgment: Judgment normal

## 2022-07-14 LAB
CYTOLOGIST CVX/VAG CYTO: NORMAL
DX ICD CODE: NORMAL
OTHER STN SPEC: NORMAL
OTHER STN SPEC: NORMAL
PATH REPORT.FINAL DX SPEC: NORMAL
SL AMB NOTE:: NORMAL
SL AMB SPECIMEN ADEQUACY: NORMAL
SL AMB TEST METHODOLOGY: NORMAL

## 2023-11-16 ENCOUNTER — ANNUAL EXAM (OUTPATIENT)
Dept: OBGYN CLINIC | Facility: CLINIC | Age: 58
End: 2023-11-16
Payer: COMMERCIAL

## 2023-11-16 VITALS
WEIGHT: 149 LBS | DIASTOLIC BLOOD PRESSURE: 58 MMHG | HEIGHT: 69 IN | BODY MASS INDEX: 22.07 KG/M2 | SYSTOLIC BLOOD PRESSURE: 110 MMHG

## 2023-11-16 DIAGNOSIS — Z13.820 SCREENING FOR OSTEOPOROSIS: ICD-10-CM

## 2023-11-16 DIAGNOSIS — Z80.3 FAMILY HISTORY OF BREAST CANCER: ICD-10-CM

## 2023-11-16 DIAGNOSIS — E28.39 ESTROGEN DEFICIENCY: ICD-10-CM

## 2023-11-16 DIAGNOSIS — Z01.419 ENCOUNTER FOR GYNECOLOGICAL EXAMINATION WITHOUT ABNORMAL FINDING: Primary | ICD-10-CM

## 2023-11-16 DIAGNOSIS — Z12.31 ENCOUNTER FOR SCREENING MAMMOGRAM FOR MALIGNANT NEOPLASM OF BREAST: ICD-10-CM

## 2023-11-16 PROCEDURE — S0612 ANNUAL GYNECOLOGICAL EXAMINA: HCPCS | Performed by: OBSTETRICS & GYNECOLOGY

## 2023-11-16 RX ORDER — RIBOFLAVIN (VITAMIN B2) 100 MG
100 TABLET ORAL AS NEEDED
COMMUNITY

## 2023-11-16 RX ORDER — CHLORAL HYDRATE 500 MG
1 CAPSULE ORAL EVERY 12 HOURS
COMMUNITY

## 2023-11-16 RX ORDER — CHOLECALCIFEROL (VITAMIN D3) 10(400)/ML
DROPS ORAL
COMMUNITY

## 2023-11-16 RX ORDER — CALCIUM CARBONATE 260MG(650)
TABLET,CHEWABLE ORAL
COMMUNITY

## 2023-11-16 NOTE — PROGRESS NOTES
[address]  [address] [place] [address], [address], [place], [address] [facility]    ASSESSMENT/PLAN: Teddy Frazier is a 61 y.o. Thuan Gutierrez who presents for annual gynecologic exam.    Encounter for routine gynecologic examination  - Routine well woman exam completed today. - Cervical Cancer Screening: Current ASCCP Guidelines reviewed. Last Pap: 07/11/2022 . Next Pap Due: 2025  - Contraceptive counseling discussed. Current contraception: none or condoms:   - Breast Cancer Screening: Last Mammogram  10/6/2022  - Colorectal cancer screening was not ordered. - The following were reviewed in today's visit: breast self exam, mammography screening ordered, menopause, osteoporosis, adequate intake of calcium and vitamin D, exercise, healthy diet, and colonoscopy discussed and ordered    Additional problems addressed during this visit:  1. Encounter for gynecological examination without abnormal finding    2. Estrogen deficiency    3. Screening for osteoporosis  Comments:  20210 spine  -2.9 on aldonerate    4. Encounter for screening mammogram for malignant neoplasm of breast  -     Mammo screening bilateral w 3d & cad; Future    5. Family history of breast cancer  -     Mammo screening bilateral w 3d & cad; Future        CC:  Annual Gynecologic Examination    HPI: Teddy Frazier is a 61 y.o. Thuan Gutierrez who presents for annual gynecologic examination. 62 yo here for wellness exam.   No bleeding,  bloating  or satiety . Folowed by  Rheumotology for    bone density. The following portions of the patient's history were reviewed and updated as appropriate: She  has a past medical history of History of bone scan (09/30/2020), History of bone scan (10/2022), History of screening mammography (09/30/2020), History of screening mammography (10/06/2022), MVP (mitral valve prolapse), and Osteoporosis. She  has a past surgical history that includes Mammo (historical); Colonoscopy (11/2016);  DXA procedure(historical) (09/30/2020); and Cholecystectomy (1994). Her family history includes Breast cancer (age of onset: 39) in her other; Diverticulitis in her sister; Heart disease in her brother. She  reports that she has never smoked. She has never used smokeless tobacco. She reports current alcohol use. She reports that she does not use drugs. Current Outpatient Medications   Medication Sig Dispense Refill   • alendronate (FOSAMAX) 70 mg tablet Take 70 mg by mouth in the morning     • Ascorbic Acid (vitamin C) 100 MG tablet Take 100 mg by mouth if needed     • cholecalciferol (VITAMIN D) 400 units/1 mL Vitamin D     • Magnesium Gluconate (MAGNESIUM 27 PO) Magnesium     • Multiple Minerals-Vitamins (BONE ESSENTIALS PO) Bone Essentials     • Multiple Vitamin (MULTI VITAMIN DAILY PO) every 24 hours     • Omega-3 Fatty Acids (fish oil) 1,000 mg 1 capsule Every 12 hours     • Vitamin K 200 MCG/0.2ML LIQD Vitamin K     • Zinc 10 MG LOZG Zinc       No current facility-administered medications for this visit. She has No Known Allergies. .    Review of Systems:  All systems normal except as noted in HPI          Objective:  /58   Ht 5' 9" (1.753 m)   Wt 67.6 kg (149 lb)   Breastfeeding No   BMI 22.00 kg/m²    Physical Exam  Vitals and nursing note reviewed. Constitutional:       Appearance: Normal appearance. HENT:      Head: Normocephalic. Cardiovascular:      Rate and Rhythm: Normal rate and regular rhythm. Pulses: Normal pulses. Heart sounds: Normal heart sounds. Pulmonary:      Effort: Pulmonary effort is normal.      Breath sounds: Normal breath sounds. Chest:      Chest wall: No mass, lacerations, swelling, tenderness or edema. Breasts: Joao Score is 4. Breasts are symmetrical.      Right: Normal. No swelling, bleeding, inverted nipple, mass, nipple discharge, skin change or tenderness.       Left: No swelling, bleeding, inverted nipple, mass, nipple discharge, skin change or tenderness. Abdominal:      General: Abdomen is flat. Bowel sounds are normal.      Palpations: Abdomen is soft. Genitourinary:     General: Normal vulva. Exam position: Lithotomy position. Pubic Area: No rash. Joao stage (genital): 4.      Labia:         Right: No rash, tenderness or lesion. Left: No rash, tenderness or lesion. Urethra: No urethral pain, urethral swelling or urethral lesion. Vagina: Normal.      Cervix: No cervical motion tenderness or discharge. Uterus: Normal.       Adnexa: Right adnexa normal and left adnexa normal.      Rectum: Normal.      Comments: atrophic  Musculoskeletal:         General: Normal range of motion. Cervical back: Neck supple. Lymphadenopathy:      Upper Body:      Right upper body: No supraclavicular, axillary or pectoral adenopathy. Left upper body: No supraclavicular, axillary or pectoral adenopathy. Lower Body: No right inguinal adenopathy. No left inguinal adenopathy. Skin:     General: Skin is warm and dry. Neurological:      General: No focal deficit present. Mental Status: She is alert and oriented to person, place, and time. Psychiatric:         Mood and Affect: Mood normal.         Behavior: Behavior normal.         Thought Content:  Thought content normal.         Judgment: Judgment normal.

## 2024-01-15 ENCOUNTER — VBI (OUTPATIENT)
Dept: ADMINISTRATIVE | Facility: OTHER | Age: 59
End: 2024-01-15

## 2024-01-15 PROBLEM — Z13.820 SCREENING FOR OSTEOPOROSIS: Status: RESOLVED | Noted: 2023-11-16 | Resolved: 2024-01-15

## 2024-01-15 PROBLEM — Z01.419 ENCOUNTER FOR GYNECOLOGICAL EXAMINATION WITHOUT ABNORMAL FINDING: Status: RESOLVED | Noted: 2023-11-16 | Resolved: 2024-01-15

## 2024-01-16 NOTE — TELEPHONE ENCOUNTER
Upon review of the In Basket request we were able to locate, review, and update the patient chart as requested for Mammogram.    Any additional questions or concerns should be emailed to the Practice Liaisons via the appropriate education email address, please do not reply via In Basket.    Thank you  ELIZABET CARVAJAL

## 2024-05-30 NOTE — PLAN OF CARE
Problem: Fall Risk (Adult)  Goal: Absence of Falls  Outcome: Ongoing (interventions implemented as appropriate)   05/10/18 1347   Fall Risk (Adult)   Absence of Falls achieves outcome         
Problem: Patient Care Overview  Goal: Discharge Needs Assessment  Outcome: Ongoing (interventions implemented as appropriate)  Sw did meet w/patient who did provide all info. Patient is s/p fall off ladder & non operable spinal injury. Patient is to be fitted for tlso brace. Await pt/ot evals for most appropriate aftercare plans. Per patient she has a stairglide from her mother in the home if needed.    05/10/18 1216   DC Needs Assessment   Concerns To Be Addressed discharge planning concerns;care coordination/care conferences   Readmission Within The Last 30 Days no previous admission in last 30 days   Provider Choice List(s) Given no   Anticipated Discharge Disposition home with home health services;inpatient rehabilitation facility/acute rehab   Type of Home Care Services home PT;home OT;nursing   Equipment Needed After Discharge walker, rolling   Current Discharge Risk dependent with mobility/activities of daily living   Current Health   Anticipated Changes Related to Illness inability to care for self   Discharge Planning   Patient/Family Concerns Related to Expected Discharge Disposition await pt/ot evals    Activity/Self Care ROS   Equipment Currently Used at Home none         
Problem: Patient Care Overview  Goal: Plan of Care Review   05/12/18 2169   Coping/Psychosocial   Plan Of Care Reviewed With patient   Plan of Care Review   Progress progress toward functional goals as expected   Outcome Summary pt will have no syncope during this shift       Problem: Fall Risk (Adult)  Goal: Identify Related Risk Factors and Signs and Symptoms  Outcome: Outcome(s) Achieved Date Met: 05/12/18        
Problem: Patient Care Overview  Goal: Plan of Care Review  Outcome: Ongoing (interventions implemented as appropriate)      Problem: Acute Therapy Services Goal & Intervention Plan  Goal: Gait Training Goal  Outcome: Ongoing (interventions implemented as appropriate)    Goal: Stairs Goal  Outcome: Ongoing (interventions implemented as appropriate)    Goal: Transfer Training Goal  Outcome: Ongoing (interventions implemented as appropriate)        
Problem: Patient Care Overview  Goal: Plan of Care Review  Outcome: Ongoing (interventions implemented as appropriate)   05/10/18 0453   Coping/Psychosocial   Plan Of Care Reviewed With patient   Plan of Care Review   Progress improving   Outcome Summary no falls; pain controlled with minimal pain medication       Problem: Fall Risk (Adult)  Goal: Identify Related Risk Factors and Signs and Symptoms  Outcome: Ongoing (interventions implemented as appropriate)    Goal: Absence of Falls  Outcome: Ongoing (interventions implemented as appropriate)        
Problem: Patient Care Overview  Goal: Plan of Care Review  Outcome: Ongoing (interventions implemented as appropriate)   05/10/18 7359   Coping/Psychosocial   Plan Of Care Reviewed With patient   Plan of Care Review   Progress progress toward functional goals as expected   Outcome Summary good tolerance with initial mobility toward resumption of BADLs        Problem: Acute Therapy Services Goal & Intervention Plan  Goal: Grooming Goal  Outcome: Ongoing (interventions implemented as appropriate)    Goal: LB Dressing Goal  Outcome: Ongoing (interventions implemented as appropriate)    Goal: Toileting Goal  Outcome: Ongoing (interventions implemented as appropriate)    Goal: UB Dressing Goal  Outcome: Ongoing (interventions implemented as appropriate)        
Problem: Patient Care Overview  Goal: Plan of Care Review  Outcome: Ongoing (interventions implemented as appropriate)   05/11/18 0650   Coping/Psychosocial   Plan Of Care Reviewed With patient   Plan of Care Review   Progress improving   Outcome Summary pt. without c/o nausea, LSO brace when OOB, monitor closely, pt has had some vaso-vgal episode, BP runs on the lower side and on pain meds. fall prevention re-enforced       Problem: Fall Risk (Adult)  Goal: Identify Related Risk Factors and Signs and Symptoms  Outcome: Ongoing (interventions implemented as appropriate)    Goal: Absence of Falls  Outcome: Ongoing (interventions implemented as appropriate)   05/11/18 0650   Fall Risk (Adult)   Absence of Falls making progress toward outcome         
Problem: Patient Care Overview  Goal: Plan of Care Review  Outcome: Ongoing (interventions implemented as appropriate)   05/11/18 1228   Coping/Psychosocial   Plan Of Care Reviewed With patient   Plan of Care Review   Progress progress toward functional goals as expected   Outcome Summary enocuragement for mobility, Ind w/LSO, S for amb        Problem: Fall Risk (Adult)  Goal: Absence of Falls  Outcome: Ongoing (interventions implemented as appropriate)      Problem: Acute Therapy Services Goal & Intervention Plan  Goal: Gait Training Goal  Outcome: Ongoing (interventions implemented as appropriate)    Goal: Stairs Goal  Outcome: Ongoing (interventions implemented as appropriate)    Goal: Transfer Training Goal  Outcome: Ongoing (interventions implemented as appropriate)        
Problem: Patient Care Overview  Goal: Plan of Care Review  Outcome: Ongoing (interventions implemented as appropriate)   05/11/18 1533   Coping/Psychosocial   Plan Of Care Reviewed With patient   Plan of Care Review   Progress progress toward functional goals as expected   Outcome Summary training provided for dressing with LSO/ patient self limited to pain        Problem: Acute Therapy Services Goal & Intervention Plan  Goal: Grooming Goal  Outcome: Outcome(s) Achieved Date Met: 05/11/18    Goal: LB Dressing Goal  Outcome: Ongoing (interventions implemented as appropriate)    Goal: Toileting Goal  Outcome: Ongoing (interventions implemented as appropriate)    Goal: UB Dressing Goal  Outcome: Ongoing (interventions implemented as appropriate)        
Problem: Patient Care Overview  Goal: Plan of Care Review  Outcome: Ongoing (interventions implemented as appropriate)   05/12/18 0406   Coping/Psychosocial   Plan Of Care Reviewed With patient   Plan of Care Review   Progress improving   Outcome Summary no falls occurred. pt was able to put on LSO brace herself. pain controlled throughout shift.       Problem: Fall Risk (Adult)  Goal: Identify Related Risk Factors and Signs and Symptoms  Outcome: Ongoing (interventions implemented as appropriate)    Goal: Absence of Falls  Outcome: Ongoing (interventions implemented as appropriate)        
Problem: Patient Care Overview  Goal: Plan of Care Review  Outcome: Ongoing (interventions implemented as appropriate)   05/13/18 5430   Coping/Psychosocial   Plan Of Care Reviewed With patient   Plan of Care Review   Progress improving   Outcome Summary pt will have no vasovagal episodes       Problem: Pressure Ulcer Risk (James Scale) (Adult,Obstetrics,Pediatric)  Goal: Identify Related Risk Factors and Signs and Symptoms  Outcome: Outcome(s) Achieved Date Met: 05/13/18    Goal: Skin Integrity  Outcome: Ongoing (interventions implemented as appropriate)        
Problem: Patient Care Overview  Goal: Plan of Care Review  Outcome: Ongoing (interventions implemented as appropriate)   05/14/18 0992   Coping/Psychosocial   Plan Of Care Reviewed With patient   Plan of Care Review   Progress progress toward functional goals as expected   Outcome Summary Supervision level of A at this time which is a decline from PLOF however progressing well with recommended home with HC PT.        Problem: Fall Risk (Adult)  Goal: Absence of Falls  Outcome: Ongoing (interventions implemented as appropriate)      Problem: Acute Therapy Services Goal & Intervention Plan  Goal: Gait Training Goal  Outcome: Ongoing (interventions implemented as appropriate)    Goal: Stairs Goal  Outcome: Ongoing (interventions implemented as appropriate)    Goal: Transfer Training Goal  Outcome: Ongoing (interventions implemented as appropriate)        
Problem: Patient Care Overview  Goal: Plan of Care Review  Outcome: Ongoing (interventions implemented as appropriate)   05/14/18 1512   Coping/Psychosocial   Plan Of Care Reviewed With patient   Plan of Care Review   Progress improving   Goal of the Day: patient will verbally understand her plan of care this shift.       
Problem: Patient Care Overview  Goal: Plan of Care Review  Outcome: Ongoing (interventions implemented as appropriate)   18 1632 18   Coping/Psychosocial   Plan Of Care Reviewed With --  patient   Plan of Care Review   Progress progress toward functional goals as expected --    Outcome Summary --  Patient will report pain less than 5/10 after administration of pain medication during this shift.        Problem: Fall Risk (Adult)  Intervention: Monitor/Assist with Self Care   18 1000 18   Functional Level Current   Ambulation --  assistive person   Transferring --  assistive equipment and person   Toileting --  assistive person   Bathing --  assistive person   Dressing --  independent   Eating --  independent   Communication --  understands/communicates without difficulty   Swallowing --  swallows foods/liquids without difficulty   Activity   Activity Assistance Provided --  limited assistance, 1 person   Daily Care Interventions   Self-Care Promotion independence encouraged --      Intervention: Reduce Risk/Promote Restraint Free Environment   18   Restraint Interventions   Safety Promotion/Fall Prevention activity supervised;fall prevention program maintained;nonskid shoes/slippers when out of bed;safety round/check completed;toileting scheduled   Safety Interventions   Environmental Safety Modification assistive device/personal items within reach;clutter free environment maintained;lighting adjusted   Prevent  Drop/Fall   Safety/Security Measures bed alarm set     Intervention: Review Medications/Identify Contributors to Fall Risk   18   Safety Interventions   Medication Review/Management medications reviewed       Goal: Absence of Falls  Outcome: Ongoing (interventions implemented as appropriate)   18   Fall Risk (Adult)   Absence of Falls making progress toward outcome         
Initial (On Arrival)

## 2025-01-16 ENCOUNTER — ANNUAL EXAM (OUTPATIENT)
Dept: OBGYN CLINIC | Facility: CLINIC | Age: 60
End: 2025-01-16
Payer: COMMERCIAL

## 2025-01-16 VITALS
DIASTOLIC BLOOD PRESSURE: 62 MMHG | BODY MASS INDEX: 21.92 KG/M2 | SYSTOLIC BLOOD PRESSURE: 110 MMHG | HEIGHT: 69 IN | WEIGHT: 148 LBS

## 2025-01-16 DIAGNOSIS — Z12.4 SCREENING FOR CERVICAL CANCER: ICD-10-CM

## 2025-01-16 DIAGNOSIS — Z12.31 ENCOUNTER FOR SCREENING MAMMOGRAM FOR MALIGNANT NEOPLASM OF BREAST: ICD-10-CM

## 2025-01-16 DIAGNOSIS — Z13.820 SCREENING FOR OSTEOPOROSIS: ICD-10-CM

## 2025-01-16 DIAGNOSIS — Z01.419 ENCOUNTER FOR GYNECOLOGICAL EXAMINATION WITHOUT ABNORMAL FINDING: Primary | ICD-10-CM

## 2025-01-16 DIAGNOSIS — Z80.3 FAMILY HISTORY OF BREAST CANCER: ICD-10-CM

## 2025-01-16 DIAGNOSIS — E28.39 ESTROGEN DEFICIENCY: ICD-10-CM

## 2025-01-16 DIAGNOSIS — N95.2 ATROPHIC VAGINITIS: ICD-10-CM

## 2025-01-16 PROCEDURE — S0612 ANNUAL GYNECOLOGICAL EXAMINA: HCPCS | Performed by: OBSTETRICS & GYNECOLOGY

## 2025-01-16 NOTE — PATIENT INSTRUCTIONS
Calcium 1200-1500mg + 600-1000 IU Vit D daily. Exercise 150 minutes per week minimum including weight bearing exercises. Pap with high risk HPV Q 3-5 years.  Annual mammogram and monthly breast self exam recommended.  Colonoscopy-  encouraged  due in 2016    . Kegels 20 times twice daily. Silicone based lubricant with sex. Vaginal moisturizers twice weekly as needed.

## 2025-01-16 NOTE — PROGRESS NOTES
[facility] - [address], [address], [place] [address]    ASSESSMENT/PLAN: Silvia Fry is a 59 y.o.  who presents for annual gynecologic exam.    Encounter for routine gynecologic examination  - Routine well woman exam completed today.  - Cervical Cancer Screening: Current ASCCP Guidelines reviewed. Last Pap: 2022 . Next Pap Due: today   - Contraceptive counseling discussed.  Current contraception: none or condoms:   - Breast Cancer Screening: Last Mammogram 01/10/2024,   - Colorectal cancer screening was not ordered.  - The following were reviewed in today's visit: breast self exam, mammography screening ordered, use and side effects of OCPs, use and side effects of HRT, menopause, adequate intake of calcium and vitamin D, exercise, healthy diet, and colonoscopy discussed     Additional problems addressed during this visit:  1. Encounter for gynecological examination without abnormal finding  2. Encounter for screening mammogram for malignant neoplasm of breast  -     Mammo screening bilateral w 3d and cad; Future  3. Family history of breast cancer  -     Mammo screening bilateral w 3d and cad; Future  4. Estrogen deficiency  5. Screening for cervical cancer  -     IGP, Aptima HPV, Rfx 16/18,45  6. Screening for osteoporosis  Comments:  Pt with  -2.6  spine   + hx of  fall  .  followed  by Rheumatology  7. Atrophic vaginitis      CC:  Annual Gynecologic Examination    HPI: Silvia Fry is a 59 y.o.  who presents for annual gynecologic examination.  HPI    The following portions of the patient's history were reviewed and updated as appropriate: She  has a past medical history of History of bone scan (2020), History of bone scan (10/2024), History of screening mammography (2020), History of screening mammography (2024), MVP (mitral valve prolapse), and Osteoporosis.  She  has a past surgical history that includes Mammo (historical);  "Colonoscopy (11/2016); DXA procedure(historical) (09/30/2020); and Cholecystectomy (1994).  Her family history includes Breast cancer (age of onset: 41) in an other family member; Diverticulitis in her sister; Heart disease in her brother.  She  reports that she has never smoked. She has never used smokeless tobacco. She reports current alcohol use. She reports that she does not use drugs.  Current Outpatient Medications   Medication Sig Dispense Refill   • alendronate (FOSAMAX) 70 mg tablet Take 70 mg by mouth in the morning     • Ascorbic Acid (vitamin C) 100 MG tablet Take 100 mg by mouth if needed     • cholecalciferol (VITAMIN D) 400 units/1 mL Vitamin D     • Magnesium Gluconate (MAGNESIUM 27 PO) Magnesium     • Multiple Minerals-Vitamins (BONE ESSENTIALS PO) Bone Essentials     • Multiple Vitamin (MULTI VITAMIN DAILY PO) every 24 hours     • Omega-3 Fatty Acids (fish oil) 1,000 mg 1 capsule Every 12 hours     • Vitamin K 200 MCG/0.2ML LIQD Vitamin K     • Zinc 10 MG LOZG Zinc       No current facility-administered medications for this visit.     She has no known allergies..    Review of Systems:  All systems normal except as noted in HPI          Objective:  /62 (BP Location: Right arm, Patient Position: Sitting, Cuff Size: Standard)   Ht 5' 9\" (1.753 m)   Wt 67.1 kg (148 lb)   BMI 21.86 kg/m²    Physical Exam  Vitals and nursing note reviewed.   Constitutional:       Appearance: Normal appearance.   HENT:      Head: Normocephalic.   Cardiovascular:      Rate and Rhythm: Normal rate and regular rhythm.      Pulses: Normal pulses.      Heart sounds: Normal heart sounds.   Pulmonary:      Effort: Pulmonary effort is normal.      Breath sounds: Normal breath sounds.   Chest:      Chest wall: No mass, lacerations, swelling, tenderness or edema.   Breasts:     Joao Score is 4.      Breasts are symmetrical.      Right: Normal. No swelling, bleeding, inverted nipple, mass, nipple discharge, skin change or " tenderness.      Left: No swelling, bleeding, inverted nipple, mass, nipple discharge, skin change or tenderness.   Abdominal:      General: Abdomen is flat. Bowel sounds are normal.      Palpations: Abdomen is soft.   Genitourinary:     General: Normal vulva.      Exam position: Lithotomy position.      Pubic Area: No rash.       Joao stage (genital): 4.      Labia:         Right: No rash, tenderness or lesion.         Left: No rash, tenderness or lesion.       Urethra: No urethral pain, urethral swelling or urethral lesion.      Vagina: Normal.      Cervix: No cervical motion tenderness or discharge.      Uterus: Normal.       Adnexa: Right adnexa normal and left adnexa normal.      Rectum: Normal.   Musculoskeletal:         General: Normal range of motion.      Cervical back: Normal range of motion and neck supple.   Lymphadenopathy:      Upper Body:      Right upper body: No supraclavicular, axillary or pectoral adenopathy.      Left upper body: No supraclavicular, axillary or pectoral adenopathy.      Lower Body: No right inguinal adenopathy. No left inguinal adenopathy.   Skin:     General: Skin is warm and dry.   Neurological:      General: No focal deficit present.      Mental Status: She is alert and oriented to person, place, and time.   Psychiatric:         Mood and Affect: Mood normal.         Behavior: Behavior normal.         Thought Content: Thought content normal.         Judgment: Judgment normal.

## 2025-01-21 LAB
CYTOLOGIST CVX/VAG CYTO: NORMAL
DX ICD CODE: NORMAL
HPV GENOTYPE REFLEX: NORMAL
HPV I/H RISK 4 DNA CVX QL PROBE+SIG AMP: NEGATIVE
OTHER STN SPEC: NORMAL
PATH REPORT.FINAL DX SPEC: NORMAL
SL AMB NOTE:: NORMAL
SL AMB SPECIMEN ADEQUACY: NORMAL
SL AMB TEST METHODOLOGY: NORMAL